# Patient Record
Sex: FEMALE | Race: WHITE | NOT HISPANIC OR LATINO | URBAN - METROPOLITAN AREA
[De-identification: names, ages, dates, MRNs, and addresses within clinical notes are randomized per-mention and may not be internally consistent; named-entity substitution may affect disease eponyms.]

---

## 2019-09-26 ENCOUNTER — INPATIENT (INPATIENT)
Facility: HOSPITAL | Age: 84
LOS: 3 days | Discharge: HOME | DRG: 299 | End: 2019-09-30
Attending: INTERNAL MEDICINE | Admitting: INTERNAL MEDICINE
Payer: MEDICARE

## 2019-09-26 VITALS
SYSTOLIC BLOOD PRESSURE: 137 MMHG | RESPIRATION RATE: 30 BRPM | DIASTOLIC BLOOD PRESSURE: 87 MMHG | HEART RATE: 96 BPM | OXYGEN SATURATION: 99 %

## 2019-09-26 DIAGNOSIS — R09.89 OTHER SPECIFIED SYMPTOMS AND SIGNS INVOLVING THE CIRCULATORY AND RESPIRATORY SYSTEMS: ICD-10-CM

## 2019-09-26 DIAGNOSIS — I26.09 OTHER PULMONARY EMBOLISM WITH ACUTE COR PULMONALE: ICD-10-CM

## 2019-09-26 DIAGNOSIS — Z90.49 ACQUIRED ABSENCE OF OTHER SPECIFIED PARTS OF DIGESTIVE TRACT: Chronic | ICD-10-CM

## 2019-09-26 DIAGNOSIS — I26.99 OTHER PULMONARY EMBOLISM WITHOUT ACUTE COR PULMONALE: ICD-10-CM

## 2019-09-26 LAB
ALBUMIN SERPL ELPH-MCNC: 3.8 G/DL — SIGNIFICANT CHANGE UP (ref 3.3–5)
ALP SERPL-CCNC: 80 U/L — SIGNIFICANT CHANGE UP (ref 40–120)
ALT FLD-CCNC: 20 U/L — SIGNIFICANT CHANGE UP (ref 10–45)
ANION GAP SERPL CALC-SCNC: 12 MMOL/L — SIGNIFICANT CHANGE UP (ref 5–17)
APTT BLD: 43.4 SEC — HIGH (ref 27.5–36.3)
AST SERPL-CCNC: 18 U/L — SIGNIFICANT CHANGE UP (ref 10–40)
BASOPHILS # BLD AUTO: 0.04 K/UL — SIGNIFICANT CHANGE UP (ref 0–0.2)
BASOPHILS NFR BLD AUTO: 0.4 % — SIGNIFICANT CHANGE UP (ref 0–2)
BILIRUB SERPL-MCNC: 0.5 MG/DL — SIGNIFICANT CHANGE UP (ref 0.2–1.2)
BLD GP AB SCN SERPL QL: NEGATIVE — SIGNIFICANT CHANGE UP
BUN SERPL-MCNC: 28 MG/DL — HIGH (ref 7–23)
CALCIUM SERPL-MCNC: 9.3 MG/DL — SIGNIFICANT CHANGE UP (ref 8.4–10.5)
CHLORIDE SERPL-SCNC: 104 MMOL/L — SIGNIFICANT CHANGE UP (ref 96–108)
CO2 SERPL-SCNC: 25 MMOL/L — SIGNIFICANT CHANGE UP (ref 22–31)
CREAT SERPL-MCNC: 1.09 MG/DL — SIGNIFICANT CHANGE UP (ref 0.5–1.3)
EOSINOPHIL # BLD AUTO: 0.08 K/UL — SIGNIFICANT CHANGE UP (ref 0–0.5)
EOSINOPHIL NFR BLD AUTO: 0.8 % — SIGNIFICANT CHANGE UP (ref 0–6)
GLUCOSE BLDC GLUCOMTR-MCNC: 110 MG/DL — HIGH (ref 70–99)
GLUCOSE SERPL-MCNC: 111 MG/DL — HIGH (ref 70–99)
HCT VFR BLD CALC: 35.9 % — SIGNIFICANT CHANGE UP (ref 34.5–45)
HGB BLD-MCNC: 11.6 G/DL — SIGNIFICANT CHANGE UP (ref 11.5–15.5)
IMM GRANULOCYTES NFR BLD AUTO: 0.2 % — SIGNIFICANT CHANGE UP (ref 0–1.5)
INR BLD: 1.17 — HIGH (ref 0.88–1.16)
LYMPHOCYTES # BLD AUTO: 1.97 K/UL — SIGNIFICANT CHANGE UP (ref 1–3.3)
LYMPHOCYTES # BLD AUTO: 19.8 % — SIGNIFICANT CHANGE UP (ref 13–44)
MCHC RBC-ENTMCNC: 29.4 PG — SIGNIFICANT CHANGE UP (ref 27–34)
MCHC RBC-ENTMCNC: 32.3 GM/DL — SIGNIFICANT CHANGE UP (ref 32–36)
MCV RBC AUTO: 90.9 FL — SIGNIFICANT CHANGE UP (ref 80–100)
MONOCYTES # BLD AUTO: 0.91 K/UL — HIGH (ref 0–0.9)
MONOCYTES NFR BLD AUTO: 9.1 % — SIGNIFICANT CHANGE UP (ref 2–14)
NEUTROPHILS # BLD AUTO: 6.95 K/UL — SIGNIFICANT CHANGE UP (ref 1.8–7.4)
NEUTROPHILS NFR BLD AUTO: 69.7 % — SIGNIFICANT CHANGE UP (ref 43–77)
NRBC # BLD: 0 /100 WBCS — SIGNIFICANT CHANGE UP (ref 0–0)
NT-PROBNP SERPL-SCNC: 7161 PG/ML — HIGH (ref 0–300)
PLATELET # BLD AUTO: 281 K/UL — SIGNIFICANT CHANGE UP (ref 150–400)
POTASSIUM SERPL-MCNC: 4.6 MMOL/L — SIGNIFICANT CHANGE UP (ref 3.5–5.3)
POTASSIUM SERPL-SCNC: 4.6 MMOL/L — SIGNIFICANT CHANGE UP (ref 3.5–5.3)
PROT SERPL-MCNC: 6.9 G/DL — SIGNIFICANT CHANGE UP (ref 6–8.3)
PROTHROM AB SERPL-ACNC: 13.3 SEC — HIGH (ref 10–12.9)
RBC # BLD: 3.95 M/UL — SIGNIFICANT CHANGE UP (ref 3.8–5.2)
RBC # FLD: 13.6 % — SIGNIFICANT CHANGE UP (ref 10.3–14.5)
RH IG SCN BLD-IMP: POSITIVE — SIGNIFICANT CHANGE UP
SODIUM SERPL-SCNC: 141 MMOL/L — SIGNIFICANT CHANGE UP (ref 135–145)
TROPONIN T SERPL-MCNC: 0.04 NG/ML — CRITICAL HIGH (ref 0–0.01)
WBC # BLD: 9.97 K/UL — SIGNIFICANT CHANGE UP (ref 3.8–10.5)
WBC # FLD AUTO: 9.97 K/UL — SIGNIFICANT CHANGE UP (ref 3.8–10.5)

## 2019-09-26 PROCEDURE — 93970 EXTREMITY STUDY: CPT | Mod: 26

## 2019-09-26 PROCEDURE — 99223 1ST HOSP IP/OBS HIGH 75: CPT | Mod: GC

## 2019-09-26 PROCEDURE — 93010 ELECTROCARDIOGRAM REPORT: CPT

## 2019-09-26 RX ORDER — METFORMIN HYDROCHLORIDE 850 MG/1
1 TABLET ORAL
Qty: 0 | Refills: 0 | DISCHARGE

## 2019-09-26 RX ORDER — FOLIC ACID 0.8 MG
1 TABLET ORAL
Qty: 0 | Refills: 0 | DISCHARGE

## 2019-09-26 RX ORDER — OMEPRAZOLE 10 MG/1
1 CAPSULE, DELAYED RELEASE ORAL
Qty: 0 | Refills: 0 | DISCHARGE

## 2019-09-26 RX ORDER — DEXTROSE 50 % IN WATER 50 %
25 SYRINGE (ML) INTRAVENOUS ONCE
Refills: 0 | Status: DISCONTINUED | OUTPATIENT
Start: 2019-09-26 | End: 2019-09-30

## 2019-09-26 RX ORDER — HEPARIN SODIUM 5000 [USP'U]/ML
5500 INJECTION INTRAVENOUS; SUBCUTANEOUS ONCE
Refills: 0 | Status: DISCONTINUED | OUTPATIENT
Start: 2019-09-26 | End: 2019-09-26

## 2019-09-26 RX ORDER — DEXTROSE 50 % IN WATER 50 %
12.5 SYRINGE (ML) INTRAVENOUS ONCE
Refills: 0 | Status: DISCONTINUED | OUTPATIENT
Start: 2019-09-26 | End: 2019-09-30

## 2019-09-26 RX ORDER — INSULIN LISPRO 100/ML
VIAL (ML) SUBCUTANEOUS EVERY 6 HOURS
Refills: 0 | Status: DISCONTINUED | OUTPATIENT
Start: 2019-09-26 | End: 2019-09-30

## 2019-09-26 RX ORDER — HEPARIN SODIUM 5000 [USP'U]/ML
1200 INJECTION INTRAVENOUS; SUBCUTANEOUS
Qty: 25000 | Refills: 0 | Status: DISCONTINUED | OUTPATIENT
Start: 2019-09-26 | End: 2019-09-26

## 2019-09-26 RX ORDER — CHLORHEXIDINE GLUCONATE 213 G/1000ML
1 SOLUTION TOPICAL
Refills: 0 | Status: DISCONTINUED | OUTPATIENT
Start: 2019-09-26 | End: 2019-09-26

## 2019-09-26 RX ORDER — GLUCAGON INJECTION, SOLUTION 0.5 MG/.1ML
1 INJECTION, SOLUTION SUBCUTANEOUS ONCE
Refills: 0 | Status: DISCONTINUED | OUTPATIENT
Start: 2019-09-26 | End: 2019-09-30

## 2019-09-26 RX ORDER — DEXTROSE 50 % IN WATER 50 %
15 SYRINGE (ML) INTRAVENOUS ONCE
Refills: 0 | Status: DISCONTINUED | OUTPATIENT
Start: 2019-09-26 | End: 2019-09-30

## 2019-09-26 RX ORDER — INFLUENZA VIRUS VACCINE 15; 15; 15; 15 UG/.5ML; UG/.5ML; UG/.5ML; UG/.5ML
0.5 SUSPENSION INTRAMUSCULAR ONCE
Refills: 0 | Status: COMPLETED | OUTPATIENT
Start: 2019-09-26 | End: 2019-09-30

## 2019-09-26 RX ORDER — LOSARTAN POTASSIUM 100 MG/1
1 TABLET, FILM COATED ORAL
Qty: 0 | Refills: 0 | DISCHARGE

## 2019-09-26 RX ORDER — CHLORHEXIDINE GLUCONATE 213 G/1000ML
1 SOLUTION TOPICAL DAILY
Refills: 0 | Status: DISCONTINUED | OUTPATIENT
Start: 2019-09-26 | End: 2019-09-30

## 2019-09-26 RX ORDER — HEPARIN SODIUM 5000 [USP'U]/ML
1200 INJECTION INTRAVENOUS; SUBCUTANEOUS
Qty: 25000 | Refills: 0 | Status: DISCONTINUED | OUTPATIENT
Start: 2019-09-26 | End: 2019-09-27

## 2019-09-26 RX ORDER — CARVEDILOL PHOSPHATE 80 MG/1
1 CAPSULE, EXTENDED RELEASE ORAL
Qty: 0 | Refills: 0 | DISCHARGE

## 2019-09-26 RX ORDER — HEPARIN SODIUM 5000 [USP'U]/ML
5500 INJECTION INTRAVENOUS; SUBCUTANEOUS ONCE
Refills: 0 | Status: COMPLETED | OUTPATIENT
Start: 2019-09-26 | End: 2019-09-26

## 2019-09-26 RX ORDER — SODIUM CHLORIDE 9 MG/ML
1000 INJECTION, SOLUTION INTRAVENOUS
Refills: 0 | Status: DISCONTINUED | OUTPATIENT
Start: 2019-09-26 | End: 2019-09-30

## 2019-09-26 RX ADMIN — HEPARIN SODIUM 12 UNIT(S)/HR: 5000 INJECTION INTRAVENOUS; SUBCUTANEOUS at 21:03

## 2019-09-26 RX ADMIN — HEPARIN SODIUM 5500 UNIT(S): 5000 INJECTION INTRAVENOUS; SUBCUTANEOUS at 21:02

## 2019-09-26 NOTE — H&P ADULT - ASSESSMENT
86 y/o F with PMH of HTN, Prediabetes, s/p cholecystectomy 8/23/19 presenting from [facility] with bilateral PE extending from right and left main pulmonary arteries into segmental and subsegmental branches admitted for possible catheter directed intervention for PE.    NEURO  -Patient AAO      CARDIAC    #HTN        PULMONARY    #Bilateral PE  -Patient has right heart strain on imaging 88 y/o F with PMH of HTN, Prediabetes, s/p cholecystectomy 8/23/19 presenting from [facility] with bilateral PE extending from right and left main pulmonary arteries into segmental and subsegmental branches admitted for possible catheter directed intervention for PE.    NEURO  -Patient AAO      CARDIAC    #Troponemia  -troponin of 0.04 on admission, likely 2/2 to PE  -continue to trend q6h    #HTN  -hold home BP meds as patient is normotensive      PULMONARY    #Bilateral PE  -Patient has right heart strain on bedside echo  -Was given Lovenox at 1:30pm  -Will start heparin bolus and drip at 9pm  -F/u LE Dopplers  -6 minute ambulation test, if fails will need catheter directed tPA    ENDO    #Prediabetes  -Patient on metformin at home  -mISS  -f/u A1c    GI  -No active issues at this time    RENAL  -Patient BUN is 28 on admission  -Continue to trend    F: None indicated  E: Replete when K<4 and Mg<2  N: DASH Consistent Carb  D: Heparin bolus and drip  Disposition: MICU

## 2019-09-26 NOTE — CONSULT NOTE ADULT - PROBLEM SELECTOR RECOMMENDATION 2
As discussed above in problem #1 and in imaging findings above, concern for RPV DVT on bedside POCUS this evening 9/26    Recommendations:  - would strongly suggest correlation with official LE doppler study As discussed above in problem #1 and in imaging findings above, concern for RPV DVT on bedside POCUS this evening 9/26    Recommendations: - would strongly suggest correlation with official LE doppler study.       F2 Addendum: Patient seen and examined at bedside AM of 9/27/19. She feels overall improved at rest with not much breathing issues. I have ambulated her off of oxygen today - she desaturated to 87%, became tachycardic to 110's, and tachypneic to 25-30 after 50 feet or so. She was taken back to the bed afterwards, where her respiratory symptoms improved after rest. Given severity of symptoms and restriction of her physical exertion capabilities in an otherwise very functional elderly lady, we have contacted interventional cardiology as she is likely a candidate for catheter directed tPA.

## 2019-09-26 NOTE — CONSULT NOTE ADULT - SUBJECTIVE AND OBJECTIVE BOX
PULMONARY EMBOLISM RESPONSE TEAM (PERT) INITIAL EVALUATION    HPI:  86 y/o F with PMH of HTN, Prediabetes, s/p cholecystectomy 8/23/19 presenting from [facility] with bilateral PE extending from right and left main pulmonary arteries into segmental and subsegmental branches admitted for possible catheter directed intervention for PE. Patient states that she has had shortness of breath for the last week that got worse last night. She has not been able to walk to the bathroom without getting short of breath the past 2 days. She states that she was much more active prior to her surgery, but admits that she has been less active as she is recovering from her surgery. She presented to [facility] with tachypnea and tachycardia, but her lungs were clear and she had no leg swelling. Her troponins were found to be elevated. On imaging she was found to have bilateral PE. She also had reflux of contrast into the IVC significant for right heart strain. She received lovenox at 1:30pm. She currently denies chest pain, palpitations, shortness of breath, nausea, vomiting, diarrhea, constipation, fevers, chills.    In the ED, vitals as follows: T: 97.1 F | HR: 100 | BP: 132/80 | RR: 26 | SpO2: 97% on RA  Labs significant for: troponin 0.04, BNP 7161  Imaging: from [facility]  EKG: Sinus tachycardia, no ischemic changes (26 Sep 2019 19:39)    ADDITIONAL PULMONARY HPI:  Besides what is stated above, she denies any prior personal or FH of VTE or clotting disorders.     REVIEW OF SYSTEMS:  Constitutional: No fever, weight loss or fatigue  Eyes: No eye pain, visual disturbances, or discharge  ENMT:  No difficulty hearing, tinnitus, vertigo; No sinus or throat pain  Neck: No pain, stiffness or neck swelling  Respiratory: see HPI  Cardiovascular: No chest pain, palpitations, dizziness or leg swelling  Gastrointestinal: No abdominal or epigastric pain. No nausea, vomiting or hematemesis; No diarrhea or constipation. No melena or hematochezia.  Genitourinary: No dysuria, frequency, hematuria or incontinence  Neurological: No headaches, memory loss, loss of strength, numbness or tremors  Skin: No itching, burning, rashes or lesions   Lymph Nodes: No enlarged glands  Endocrine: No heat or cold intolerance; No hair loss  Musculoskeletal: No joint pain or swelling; No muscle, back or extremity pain  Psychiatric: No depression, anxiety, mood swings or difficulty sleeping  Heme/Lymph: No easy bruising or bleeding gums  Allergy and Immunologic: No hives or eczema    PAST MEDICAL & SURGICAL HISTORY:  Pre-diabetes  HTN (hypertension)  S/P cholecystectomy: 8/23/19    FAMILY HISTORY:  No FH of VTE or clotting disorders    SOCIAL HISTORY:  Smoking Status: [ ] Current, [ ] Former, [X] Never  Pack Years:    MEDICATIONS:  Pulmonary:    Antimicrobials:    Anticoagulants:  heparin  Infusion 1200 Unit(s)/Hr IV Continuous <Continuous>    Onc:    GI/:    Endocrine:  dextrose 40% Gel 15 Gram(s) Oral once PRN  dextrose 50% Injectable 12.5 Gram(s) IV Push once  dextrose 50% Injectable 25 Gram(s) IV Push once  dextrose 50% Injectable 25 Gram(s) IV Push once  glucagon  Injectable 1 milliGRAM(s) IntraMuscular once PRN  insulin lispro (HumaLOG) corrective regimen sliding scale   SubCutaneous every 6 hours    Cardiac:    Other Medications:  chlorhexidine 2% Cloths 1 Application(s) Topical daily  dextrose 5%. 1000 milliLiter(s) IV Continuous <Continuous>  influenza   Vaccine 0.5 milliLiter(s) IntraMuscular once    Allergies    Allergy Status Unknown    Intolerances    Vital Signs Last 24 Hrs  T(C): 36.9 (26 Sep 2019 21:00), Max: 36.9 (26 Sep 2019 21:00)  T(F): 98.5 (26 Sep 2019 21:00), Max: 98.5 (26 Sep 2019 21:00)  HR: 94 (26 Sep 2019 20:00) (94 - 100)  BP: 128/78 (26 Sep 2019 20:00) (128/78 - 137/87)  BP(mean): 99 (26 Sep 2019 20:00) (98 - 104)  RR: 27 (26 Sep 2019 20:00) (26 - 30)  SpO2: 98% (26 Sep 2019 20:00) (97% - 99%)    09-26 @ 07:01  -  09-26 @ 21:07  --------------------------------------------------------  IN: 0 mL / OUT: 100 mL / NET: -100 mL    PHYSICAL EXAM:  Constitutional: well appearing woman resting in bed; NAD  Head: NC/AT  EENT: PERRL, anicteric sclera; oropharynx clear, MMM  Neck: supple   Respiratory: CTA B/L; no W/R/R - breathing comfortably on nasal cannula  Cardiovascular: +S1/S2, RRR  Gastrointestinal: soft, NT/ND; +BSx4  Extremities: WWP; mild LE edema but no clubbing or cyanosis  Vascular: 2+ radial pulses B/L  Neurological: awake and alert; PRYOR, following comamnds     LABS:  CBC Full  -  ( 26 Sep 2019 18:42 )  WBC Count : 9.97 K/uL  RBC Count : 3.95 M/uL  Hemoglobin : 11.6 g/dL  Hematocrit : 35.9 %  Platelet Count - Automated : 281 K/uL  Mean Cell Volume : 90.9 fl  Mean Cell Hemoglobin : 29.4 pg  Mean Cell Hemoglobin Concentration : 32.3 gm/dL  Auto Neutrophil # : 6.95 K/uL  Auto Lymphocyte # : 1.97 K/uL  Auto Monocyte # : 0.91 K/uL  Auto Eosinophil # : 0.08 K/uL  Auto Basophil # : 0.04 K/uL  Auto Neutrophil % : 69.7 %  Auto Lymphocyte % : 19.8 %  Auto Monocyte % : 9.1 %  Auto Eosinophil % : 0.8 %  Auto Basophil % : 0.4 %    09-26    141  |  104  |  28<H>  ----------------------------<  111<H>  4.6   |  25  |  1.09    Ca    9.3      26 Sep 2019 18:42    TPro  6.9  /  Alb  3.8  /  TBili  0.5  /  DBili  x   /  AST  18  /  ALT  20  /  AlkPhos  80  09-26    PT/INR - ( 26 Sep 2019 18:42 )   PT: 13.3 sec;   INR: 1.17          PTT - ( 26 Sep 2019 18:42 )  PTT:43.4 sec    RADIOLOGY & ADDITIONAL STUDIES:  Outside hospital CTA PE protocol personally reviewed - extensive bilateral thrombi noted in left and right main pulmonary arteries with extension of thrombi distally into segmental and subsegmental branches; markedly enlarged right ventricular size with RV>LV on CT consistent with radiographic right heart strain    BEDSIDE POCUS (performed by myself 9/26/19 @ ~1930hrs)  Lungs:  --> Right lung: B-line predominant pattern at the apex with A-line pred. at bases, no appreciable effusion  --> Left lung: A-line predominant pattern throughout; no appreciable effusion    Cardiac:  LV appears concentrically hypertrophic w/ normal to mildly reduced systolic function on gross appearance; RV >> LV diameter; no evidence of pericardial effusion    LE DVT study:  Unable to compress RPV and intraluminal echogenic material was observed concerning for thrombus -- strongly recommend official LE dopplers to correlate findings; remaining right and left LE venous study appeared normal

## 2019-09-26 NOTE — H&P ADULT - NSHPLABSRESULTS_GEN_ALL_CORE
LABS:                         11.6   9.97  )-----------( 281      ( 26 Sep 2019 18:42 )             35.9     09-26    141  |  104  |  28<H>  ----------------------------<  111<H>  4.6   |  25  |  1.09    Ca    9.3      26 Sep 2019 18:42    TPro  6.9  /  Alb  3.8  /  TBili  0.5  /  DBili  x   /  AST  18  /  ALT  20  /  AlkPhos  80  09-26    PT/INR - ( 26 Sep 2019 18:42 )   PT: 13.3 sec;   INR: 1.17          PTT - ( 26 Sep 2019 18:42 )  PTT:43.4 sec    CARDIAC MARKERS ( 26 Sep 2019 18:42 )  x     / 0.04 ng/mL / x     / x     / x          Serum Pro-Brain Natriuretic Peptide: 7161 pg/mL (09-26 @ 18:42)        RADIOLOGY, EKG & ADDITIONAL TESTS: Reviewed

## 2019-09-26 NOTE — H&P ADULT - HISTORY OF PRESENT ILLNESS
86 y/o F with PMH of HTN, Prediabetes, s/p cholecystectomy 8/23/19 presenting from [facility] with bilateral PE causing right heart strain. Patient states that she has had shortness of breath for the last week that got worse last night. She has not been able to walk to the bathroom without getting short of breath the past 2 days. She admits that she has been less active since her surgery as she has been recovering. She presented to [facility] with tachypnea and tachycardia, but her lungs were clear and she had no leg swelling. Her troponins were found to be elevated. On imaging she was found to have bilateral PE. She also had reflux of contrast into the IVC significant for right heart strain. She received lovenox at 1:30pm 86 y/o F with PMH of HTN, Prediabetes, s/p cholecystectomy 8/23/19 presenting from [facility] with bilateral PE extending from right and left main pulmonary arteries into segmental and subsegmental branches admitted for possible catheter directed intervention for PE. Patient states that she has had shortness of breath for the last week that got worse last night. She has not been able to walk to the bathroom without getting short of breath the past 2 days. She states that she was much more active prior to her surgery, but admits that she has been less active as she is recovering from her surgery. She presented to [facility] with tachypnea and tachycardia, but her lungs were clear and she had no leg swelling. Her troponins were found to be elevated. On imaging she was found to have bilateral PE. She also had reflux of contrast into the IVC significant for right heart strain. She received lovenox at 1:30pm. She currently denies chest pain, palpitations, shortness of breath, nausea, vomiting, diarrhea, constipation, fevers, chills.    In the ED, vitals as follows: T: 97.1 F | HR: 100 | BP: 132/80 | RR: 26 | SpO2: 97% on RA  Labs significant for: troponin 0.04, BNP 7161  Imaging: from [facility]  EKG: 86 y/o F with PMH of HTN, Prediabetes, s/p cholecystectomy 8/23/19 presenting from [facility] with bilateral PE extending from right and left main pulmonary arteries into segmental and subsegmental branches admitted for possible catheter directed intervention for PE. Patient states that she has had shortness of breath for the last week that got worse last night. She has not been able to walk to the bathroom without getting short of breath the past 2 days. She states that she was much more active prior to her surgery, but admits that she has been less active as she is recovering from her surgery. She presented to [facility] with tachypnea and tachycardia, but her lungs were clear and she had no leg swelling. Her troponins were found to be elevated. On imaging she was found to have bilateral PE. She also had reflux of contrast into the IVC significant for right heart strain. She received lovenox at 1:30pm. She currently denies chest pain, palpitations, shortness of breath, nausea, vomiting, diarrhea, constipation, fevers, chills.    In the ED, vitals as follows: T: 97.1 F | HR: 100 | BP: 132/80 | RR: 26 | SpO2: 97% on RA  Labs significant for: troponin 0.04, BNP 7161  Imaging: from [facility]  EKG: Sinus tachycardia, no ischemic changes yes

## 2019-09-26 NOTE — H&P ADULT - NSHPSOCIALHISTORY_GEN_ALL_CORE
Patient states she has never smoked. She does not drink alcohol. She denies using any other drugs. She lives alone, though she lives on the floor above her daughter, who checks in on her daily and takes her grocery shopping. Patient is very independent.

## 2019-09-26 NOTE — CONSULT NOTE ADULT - PROBLEM SELECTOR RECOMMENDATION 9
As discussed above in imaging/US findings and personal review of her outside CT scan, she has bilateral extensive thrombi in L+R main PAs extending distally to segmental and subsegmental pulmonary arteries with troponinemia and elevated pro-BNP but without HD instability therefore submassive PE; was given dose of lovenox at OSH. Her PE is likely provoked given her recent post-operative state.    Recommendations:  - multi-disciplinary PERT evaluation underway; to be eval by interventional cardiology for consideration of catheter directed tPA pending clinical course overnight/sx in AM; please keep NPOpMN in case she requires intervention in AM  - would start hep gtt w/ bolus approximately 8hrs following her last dose of SQL with PTT trended for full anticoagulant dosing  - please obtain stat echocardiography (performed at time of note writing)  - please obtain serum troponin and pro-BNP (resulted at time of note writing)  - please obtain LE doppler study (as discussed in imaging above, concern for RPV DVT on bedside POCUS - verbally communicated to ICU team)  - cont supplemental O2 PRN to maintain SpO2=>92%  - should be ambulated in AM w/ SpO2 and HR monitoring to assess for HD changes/hypoxemia  - incentive spirometry 10x/hr while in bed    The above was discussed w/ patient, her daughter, ICU team, and attending

## 2019-09-26 NOTE — H&P ADULT - NSHPPHYSICALEXAM_GEN_ALL_CORE
VITAL SIGNS:  T(F): 97.1 (09-26-19 @ 18:12), Max: 97.1 (09-26-19 @ 18:12)  HR: 96 (09-26-19 @ 18:00) (96 - 96)  BP: 137/87 (09-26-19 @ 18:00) (137/87 - 137/87)  BP(mean): 104 (09-26-19 @ 18:00) (104 - 104)  RR: 30 (09-26-19 @ 18:00) (30 - 30)  SpO2: 99% (09-26-19 @ 18:00) (99% - 99%)    PHYSICAL EXAM:    Constitutional: WDWN resting comfortably in bed; NAD  HEENT: NC/AT, PERRL, EOMI, anicteric sclera; MMM  Neck: supple  Respiratory: CTA B/L; no W/R/R, no retractions  Cardiac: +S1/S2; Tachycardic; no M/R/G  Gastrointestinal: soft, NT/ND; no rebound or guarding; +BSx4  Extremities: WWP, no clubbing or cyanosis; no peripheral edema  Vascular: 2+ radial, DP/PT pulses B/L  Neurologic: AAO; CNII-XII grossly intact; no focal deficits

## 2019-09-27 LAB
ALBUMIN SERPL ELPH-MCNC: 3.5 G/DL — SIGNIFICANT CHANGE UP (ref 3.3–5)
ALP SERPL-CCNC: 71 U/L — SIGNIFICANT CHANGE UP (ref 40–120)
ALT FLD-CCNC: 17 U/L — SIGNIFICANT CHANGE UP (ref 10–45)
ANION GAP SERPL CALC-SCNC: 12 MMOL/L — SIGNIFICANT CHANGE UP (ref 5–17)
ANION GAP SERPL CALC-SCNC: 12 MMOL/L — SIGNIFICANT CHANGE UP (ref 5–17)
APTT BLD: 100.3 SEC — HIGH (ref 27.5–36.3)
APTT BLD: 136.8 SEC — CRITICAL HIGH (ref 27.5–36.3)
APTT BLD: 95.4 SEC — HIGH (ref 27.5–36.3)
APTT BLD: >200 SEC — CRITICAL HIGH (ref 27.5–36.3)
AST SERPL-CCNC: 15 U/L — SIGNIFICANT CHANGE UP (ref 10–40)
BASOPHILS # BLD AUTO: 0.06 K/UL — SIGNIFICANT CHANGE UP (ref 0–0.2)
BASOPHILS NFR BLD AUTO: 0.6 % — SIGNIFICANT CHANGE UP (ref 0–2)
BILIRUB SERPL-MCNC: 0.4 MG/DL — SIGNIFICANT CHANGE UP (ref 0.2–1.2)
BUN SERPL-MCNC: 24 MG/DL — HIGH (ref 7–23)
BUN SERPL-MCNC: 28 MG/DL — HIGH (ref 7–23)
CALCIUM SERPL-MCNC: 9 MG/DL — SIGNIFICANT CHANGE UP (ref 8.4–10.5)
CALCIUM SERPL-MCNC: 9.4 MG/DL — SIGNIFICANT CHANGE UP (ref 8.4–10.5)
CHLORIDE SERPL-SCNC: 103 MMOL/L — SIGNIFICANT CHANGE UP (ref 96–108)
CHLORIDE SERPL-SCNC: 106 MMOL/L — SIGNIFICANT CHANGE UP (ref 96–108)
CO2 SERPL-SCNC: 23 MMOL/L — SIGNIFICANT CHANGE UP (ref 22–31)
CO2 SERPL-SCNC: 24 MMOL/L — SIGNIFICANT CHANGE UP (ref 22–31)
CREAT SERPL-MCNC: 0.96 MG/DL — SIGNIFICANT CHANGE UP (ref 0.5–1.3)
CREAT SERPL-MCNC: 0.97 MG/DL — SIGNIFICANT CHANGE UP (ref 0.5–1.3)
EOSINOPHIL # BLD AUTO: 0.16 K/UL — SIGNIFICANT CHANGE UP (ref 0–0.5)
EOSINOPHIL NFR BLD AUTO: 1.6 % — SIGNIFICANT CHANGE UP (ref 0–6)
GLUCOSE BLDC GLUCOMTR-MCNC: 129 MG/DL — HIGH (ref 70–99)
GLUCOSE BLDC GLUCOMTR-MCNC: 133 MG/DL — HIGH (ref 70–99)
GLUCOSE BLDC GLUCOMTR-MCNC: 136 MG/DL — HIGH (ref 70–99)
GLUCOSE BLDC GLUCOMTR-MCNC: 167 MG/DL — HIGH (ref 70–99)
GLUCOSE BLDC GLUCOMTR-MCNC: 170 MG/DL — HIGH (ref 70–99)
GLUCOSE SERPL-MCNC: 122 MG/DL — HIGH (ref 70–99)
GLUCOSE SERPL-MCNC: 144 MG/DL — HIGH (ref 70–99)
HBA1C BLD-MCNC: 6.9 % — HIGH (ref 4–5.6)
HCT VFR BLD CALC: 32.5 % — LOW (ref 34.5–45)
HGB BLD-MCNC: 10.6 G/DL — LOW (ref 11.5–15.5)
IMM GRANULOCYTES NFR BLD AUTO: 0.4 % — SIGNIFICANT CHANGE UP (ref 0–1.5)
INR BLD: 1.27 — HIGH (ref 0.88–1.16)
LYMPHOCYTES # BLD AUTO: 1.96 K/UL — SIGNIFICANT CHANGE UP (ref 1–3.3)
LYMPHOCYTES # BLD AUTO: 20.2 % — SIGNIFICANT CHANGE UP (ref 13–44)
MAGNESIUM SERPL-MCNC: 0.9 MG/DL — CRITICAL LOW (ref 1.6–2.6)
MAGNESIUM SERPL-MCNC: 3 MG/DL — HIGH (ref 1.6–2.6)
MCHC RBC-ENTMCNC: 29.5 PG — SIGNIFICANT CHANGE UP (ref 27–34)
MCHC RBC-ENTMCNC: 32.6 GM/DL — SIGNIFICANT CHANGE UP (ref 32–36)
MCV RBC AUTO: 90.5 FL — SIGNIFICANT CHANGE UP (ref 80–100)
MONOCYTES # BLD AUTO: 1.02 K/UL — HIGH (ref 0–0.9)
MONOCYTES NFR BLD AUTO: 10.5 % — SIGNIFICANT CHANGE UP (ref 2–14)
NEUTROPHILS # BLD AUTO: 6.48 K/UL — SIGNIFICANT CHANGE UP (ref 1.8–7.4)
NEUTROPHILS NFR BLD AUTO: 66.7 % — SIGNIFICANT CHANGE UP (ref 43–77)
NRBC # BLD: 0 /100 WBCS — SIGNIFICANT CHANGE UP (ref 0–0)
PHOSPHATE SERPL-MCNC: 3.3 MG/DL — SIGNIFICANT CHANGE UP (ref 2.5–4.5)
PLATELET # BLD AUTO: 268 K/UL — SIGNIFICANT CHANGE UP (ref 150–400)
POTASSIUM SERPL-MCNC: 4.2 MMOL/L — SIGNIFICANT CHANGE UP (ref 3.5–5.3)
POTASSIUM SERPL-MCNC: 4.3 MMOL/L — SIGNIFICANT CHANGE UP (ref 3.5–5.3)
POTASSIUM SERPL-SCNC: 4.2 MMOL/L — SIGNIFICANT CHANGE UP (ref 3.5–5.3)
POTASSIUM SERPL-SCNC: 4.3 MMOL/L — SIGNIFICANT CHANGE UP (ref 3.5–5.3)
PROT SERPL-MCNC: 6.2 G/DL — SIGNIFICANT CHANGE UP (ref 6–8.3)
PROTHROM AB SERPL-ACNC: 14.5 SEC — HIGH (ref 10–12.9)
RBC # BLD: 3.59 M/UL — LOW (ref 3.8–5.2)
RBC # FLD: 13.7 % — SIGNIFICANT CHANGE UP (ref 10.3–14.5)
SODIUM SERPL-SCNC: 139 MMOL/L — SIGNIFICANT CHANGE UP (ref 135–145)
SODIUM SERPL-SCNC: 141 MMOL/L — SIGNIFICANT CHANGE UP (ref 135–145)
TROPONIN T SERPL-MCNC: 0.02 NG/ML — HIGH (ref 0–0.01)
WBC # BLD: 9.72 K/UL — SIGNIFICANT CHANGE UP (ref 3.8–10.5)
WBC # FLD AUTO: 9.72 K/UL — SIGNIFICANT CHANGE UP (ref 3.8–10.5)

## 2019-09-27 PROCEDURE — 99233 SBSQ HOSP IP/OBS HIGH 50: CPT | Mod: GC

## 2019-09-27 PROCEDURE — 93306 TTE W/DOPPLER COMPLETE: CPT | Mod: 26

## 2019-09-27 PROCEDURE — 71045 X-RAY EXAM CHEST 1 VIEW: CPT | Mod: 26

## 2019-09-27 RX ORDER — MAGNESIUM SULFATE 500 MG/ML
4 VIAL (ML) INJECTION
Refills: 0 | Status: COMPLETED | OUTPATIENT
Start: 2019-09-27 | End: 2019-09-27

## 2019-09-27 RX ORDER — HEPARIN SODIUM 5000 [USP'U]/ML
1000 INJECTION INTRAVENOUS; SUBCUTANEOUS
Qty: 25000 | Refills: 0 | Status: DISCONTINUED | OUTPATIENT
Start: 2019-09-27 | End: 2019-09-27

## 2019-09-27 RX ORDER — HEPARIN SODIUM 5000 [USP'U]/ML
800 INJECTION INTRAVENOUS; SUBCUTANEOUS
Qty: 25000 | Refills: 0 | Status: DISCONTINUED | OUTPATIENT
Start: 2019-09-27 | End: 2019-09-28

## 2019-09-27 RX ADMIN — HEPARIN SODIUM 8 UNIT(S)/HR: 5000 INJECTION INTRAVENOUS; SUBCUTANEOUS at 20:39

## 2019-09-27 RX ADMIN — HEPARIN SODIUM 10 UNIT(S)/HR: 5000 INJECTION INTRAVENOUS; SUBCUTANEOUS at 05:30

## 2019-09-27 RX ADMIN — Medication 50 GRAM(S): at 04:30

## 2019-09-27 RX ADMIN — Medication 50 GRAM(S): at 05:56

## 2019-09-27 RX ADMIN — CHLORHEXIDINE GLUCONATE 1 APPLICATION(S): 213 SOLUTION TOPICAL at 07:00

## 2019-09-27 RX ADMIN — Medication 2: at 19:20

## 2019-09-27 NOTE — PROGRESS NOTE ADULT - SUBJECTIVE AND OBJECTIVE BOX
OVERNIGHT EVENTS:    INTERVAL HPI:    VITAL SIGNS:  ICU Vital Signs Last 24 Hrs  T(C): 36.5 (27 Sep 2019 05:42), Max: 36.9 (26 Sep 2019 21:00)  T(F): 97.7 (27 Sep 2019 05:42), Max: 98.5 (26 Sep 2019 21:00)  HR: 88 (27 Sep 2019 06:00) (88 - 102)  BP: 109/67 (27 Sep 2019 06:00) (100/65 - 137/87)  BP(mean): 80 (27 Sep 2019 06:00) (76 - 104)  RR: 28 (27 Sep 2019 06:00) (20 - 30)  SpO2: 96% (27 Sep 2019 06:00) (94% - 99%)      PHYSICAL EXAM:    General: in NAD, lying comfortably in bed  HEENT: normocephalic, atraumatic; PERRL, anicteric sclera; MMM  Neck: supple, no JVD, no thyromegaly, no lymphadenopathy  Cardiovascular: +S1/S2, RRR, no M/G/R  Respiratory: clear to auscultation B/L; no wheezing, no rales, no rhonchi  Gastrointestinal: soft, NT/ND; +BSx4, no organomegaly  Extremities: WWP; no edema, clubbing or cyanosis  Vascular: 2+ radial, DP/PT pulses B/L  Neurological: AAOx3; no focal deficits    MEDICATIONS:  MEDICATIONS  (STANDING):  chlorhexidine 2% Cloths 1 Application(s) Topical daily  dextrose 5%. 1000 milliLiter(s) (50 mL/Hr) IV Continuous <Continuous>  dextrose 50% Injectable 12.5 Gram(s) IV Push once  dextrose 50% Injectable 25 Gram(s) IV Push once  dextrose 50% Injectable 25 Gram(s) IV Push once  heparin  Infusion 1000 Unit(s)/Hr (10 mL/Hr) IV Continuous <Continuous>  influenza   Vaccine 0.5 milliLiter(s) IntraMuscular once  insulin lispro (HumaLOG) corrective regimen sliding scale   SubCutaneous every 6 hours    MEDICATIONS  (PRN):  dextrose 40% Gel 15 Gram(s) Oral once PRN Blood Glucose LESS THAN 70 milliGRAM(s)/deciliter  glucagon  Injectable 1 milliGRAM(s) IntraMuscular once PRN Glucose LESS THAN 70 milligrams/deciliter      ALLERGIES:  Allergies    Allergy Status Unknown    Intolerances        LABS:                        10.6   9.72  )-----------( 268      ( 27 Sep 2019 03:38 )             32.5     09-27    141  |  106  |  28<H>  ----------------------------<  122<H>  4.2   |  23  |  0.96    Ca    9.0      27 Sep 2019 03:38  Mg     .9     09-27    TPro  6.2  /  Alb  3.5  /  TBili  0.4  /  DBili  x   /  AST  15  /  ALT  17  /  AlkPhos  71  09-27    PT/INR - ( 27 Sep 2019 03:38 )   PT: 14.5 sec;   INR: 1.27          PTT - ( 27 Sep 2019 03:38 )  PTT:136.8 sec    CAPILLARY BLOOD GLUCOSE      POCT Blood Glucose.: 136 mg/dL (27 Sep 2019 05:58)      RADIOLOGY & ADDITIONAL TESTS: Reviewed. OVERNIGHT EVENTS:    INTERVAL HPI:    VITAL SIGNS:  ICU Vital Signs Last 24 Hrs  T(C): 36.5 (27 Sep 2019 05:42), Max: 36.9 (26 Sep 2019 21:00)  T(F): 97.7 (27 Sep 2019 05:42), Max: 98.5 (26 Sep 2019 21:00)  HR: 88 (27 Sep 2019 06:00) (88 - 102)  BP: 109/67 (27 Sep 2019 06:00) (100/65 - 137/87)  BP(mean): 80 (27 Sep 2019 06:00) (76 - 104)  RR: 28 (27 Sep 2019 06:00) (20 - 30)  SpO2: 96% (27 Sep 2019 06:00) (94% - 99%)      I&O's Summary    26 Sep 2019 07:01  -  27 Sep 2019 07:00  --------------------------------------------------------  IN: 490 mL / OUT: 500 mL / NET: -10 mL    27 Sep 2019 07:01  -  27 Sep 2019 17:28  --------------------------------------------------------  IN: 516 mL / OUT: 300 mL / NET: 216 mL        PHYSICAL EXAM:    General: in NAD, lying comfortably in bed  HEENT: NC/AT; PERRL, anicteric sclera; MMM  Neck: supple  Cardiovascular: +S1/S2, RRR, no M/R/G  Respiratory: clear to auscultation B/L; W/R/R  Gastrointestinal: soft, NT/ND; +BSx4  Extremities: WWP; no edema, clubbing or cyanosis  Vascular: 2+ radial, DP/PT pulses B/L  Neurological: AAOx3; no focal deficits    MEDICATIONS:  MEDICATIONS  (STANDING):  chlorhexidine 2% Cloths 1 Application(s) Topical daily  dextrose 5%. 1000 milliLiter(s) (50 mL/Hr) IV Continuous <Continuous>  dextrose 50% Injectable 12.5 Gram(s) IV Push once  dextrose 50% Injectable 25 Gram(s) IV Push once  dextrose 50% Injectable 25 Gram(s) IV Push once  heparin  Infusion 1000 Unit(s)/Hr (10 mL/Hr) IV Continuous <Continuous>  influenza   Vaccine 0.5 milliLiter(s) IntraMuscular once  insulin lispro (HumaLOG) corrective regimen sliding scale   SubCutaneous every 6 hours    MEDICATIONS  (PRN):  dextrose 40% Gel 15 Gram(s) Oral once PRN Blood Glucose LESS THAN 70 milliGRAM(s)/deciliter  glucagon  Injectable 1 milliGRAM(s) IntraMuscular once PRN Glucose LESS THAN 70 milligrams/deciliter      ALLERGIES:  Allergies    Allergy Status Unknown    Intolerances        LABS:                        10.6   9.72  )-----------( 268      ( 27 Sep 2019 03:38 )             32.5     09-27    141  |  106  |  28<H>  ----------------------------<  122<H>  4.2   |  23  |  0.96    Ca    9.0      27 Sep 2019 03:38  Mg     .9     09-27    TPro  6.2  /  Alb  3.5  /  TBili  0.4  /  DBili  x   /  AST  15  /  ALT  17  /  AlkPhos  71  09-27    PT/INR - ( 27 Sep 2019 03:38 )   PT: 14.5 sec;   INR: 1.27          PTT - ( 27 Sep 2019 03:38 )  PTT:136.8 sec    CAPILLARY BLOOD GLUCOSE      POCT Blood Glucose.: 136 mg/dL (27 Sep 2019 05:58)      RADIOLOGY & ADDITIONAL TESTS: Reviewed. OVERNIGHT EVENTS: Patient's PTT was elevated over 200 and Heparin drip was decreased.    INTERVAL HPI: Patient seen and examined this AM. She denies having any complaints. Discussed options for treatment (continue heparin vs tPA) and she will make a decision tomorrow.    VITAL SIGNS:  ICU Vital Signs Last 24 Hrs  T(C): 36.5 (27 Sep 2019 05:42), Max: 36.9 (26 Sep 2019 21:00)  T(F): 97.7 (27 Sep 2019 05:42), Max: 98.5 (26 Sep 2019 21:00)  HR: 88 (27 Sep 2019 06:00) (88 - 102)  BP: 109/67 (27 Sep 2019 06:00) (100/65 - 137/87)  BP(mean): 80 (27 Sep 2019 06:00) (76 - 104)  RR: 28 (27 Sep 2019 06:00) (20 - 30)  SpO2: 96% (27 Sep 2019 06:00) (94% - 99%)      I&O's Summary    26 Sep 2019 07:01  -  27 Sep 2019 07:00  --------------------------------------------------------  IN: 490 mL / OUT: 500 mL / NET: -10 mL    27 Sep 2019 07:01  -  27 Sep 2019 17:28  --------------------------------------------------------  IN: 516 mL / OUT: 300 mL / NET: 216 mL        PHYSICAL EXAM:    General: in NAD, lying comfortably in bed  HEENT: NC/AT; PERRL, anicteric sclera; MMM  Neck: supple  Cardiovascular: +S1/S2, RRR, no M/R/G  Respiratory: clear to auscultation B/L; W/R/R  Gastrointestinal: soft, NT/ND; +BSx4  Extremities: WWP; no edema, clubbing or cyanosis  Vascular: 2+ radial, DP/PT pulses B/L  Neurological: AAOx3; no focal deficits    MEDICATIONS:  MEDICATIONS  (STANDING):  chlorhexidine 2% Cloths 1 Application(s) Topical daily  dextrose 5%. 1000 milliLiter(s) (50 mL/Hr) IV Continuous <Continuous>  dextrose 50% Injectable 12.5 Gram(s) IV Push once  dextrose 50% Injectable 25 Gram(s) IV Push once  dextrose 50% Injectable 25 Gram(s) IV Push once  heparin  Infusion 1000 Unit(s)/Hr (10 mL/Hr) IV Continuous <Continuous>  influenza   Vaccine 0.5 milliLiter(s) IntraMuscular once  insulin lispro (HumaLOG) corrective regimen sliding scale   SubCutaneous every 6 hours    MEDICATIONS  (PRN):  dextrose 40% Gel 15 Gram(s) Oral once PRN Blood Glucose LESS THAN 70 milliGRAM(s)/deciliter  glucagon  Injectable 1 milliGRAM(s) IntraMuscular once PRN Glucose LESS THAN 70 milligrams/deciliter      ALLERGIES:  Allergies    Allergy Status Unknown    Intolerances        LABS:                        10.6   9.72  )-----------( 268      ( 27 Sep 2019 03:38 )             32.5     09-27    141  |  106  |  28<H>  ----------------------------<  122<H>  4.2   |  23  |  0.96    Ca    9.0      27 Sep 2019 03:38  Mg     .9     09-27    TPro  6.2  /  Alb  3.5  /  TBili  0.4  /  DBili  x   /  AST  15  /  ALT  17  /  AlkPhos  71  09-27    PT/INR - ( 27 Sep 2019 03:38 )   PT: 14.5 sec;   INR: 1.27          PTT - ( 27 Sep 2019 03:38 )  PTT:136.8 sec    CAPILLARY BLOOD GLUCOSE      POCT Blood Glucose.: 136 mg/dL (27 Sep 2019 05:58)      RADIOLOGY & ADDITIONAL TESTS: Reviewed. OVERNIGHT EVENTS: Patient's PTT was elevated over 200 and Heparin drip was decreased.    INTERVAL HPI: Patient seen and examined this AM. She denies having any complaints. Discussed options for treatment (continue heparin vs tPA) and she will make a decision tomorrow.    VITAL SIGNS:  ICU Vital Signs Last 24 Hrs  T(C): 36.5 (27 Sep 2019 05:42), Max: 36.9 (26 Sep 2019 21:00)  T(F): 97.7 (27 Sep 2019 05:42), Max: 98.5 (26 Sep 2019 21:00)  HR: 88 (27 Sep 2019 06:00) (88 - 102)  BP: 109/67 (27 Sep 2019 06:00) (100/65 - 137/87)  BP(mean): 80 (27 Sep 2019 06:00) (76 - 104)  RR: 28 (27 Sep 2019 06:00) (20 - 30)  SpO2: 96% (27 Sep 2019 06:00) (94% - 99%)      I&O's Summary    26 Sep 2019 07:01  -  27 Sep 2019 07:00  --------------------------------------------------------  IN: 490 mL / OUT: 500 mL / NET: -10 mL    27 Sep 2019 07:01  -  27 Sep 2019 17:28  --------------------------------------------------------  IN: 516 mL / OUT: 300 mL / NET: 216 mL        PHYSICAL EXAM:    General: in NAD, lying comfortably in bed  HEENT: NC/AT; PERRL, anicteric sclera; MMM  Neck: supple  Cardiovascular: +S1/S2, RRR, no M/R/G  Respiratory: clear to auscultation B/L; no W/R/R  Gastrointestinal: soft, NT/ND; +BSx4  Extremities: WWP; no edema, clubbing or cyanosis  Vascular: 2+ radial, DP/PT pulses B/L  Neurological: AAOx3; no focal deficits    MEDICATIONS:  MEDICATIONS  (STANDING):  chlorhexidine 2% Cloths 1 Application(s) Topical daily  dextrose 5%. 1000 milliLiter(s) (50 mL/Hr) IV Continuous <Continuous>  dextrose 50% Injectable 12.5 Gram(s) IV Push once  dextrose 50% Injectable 25 Gram(s) IV Push once  dextrose 50% Injectable 25 Gram(s) IV Push once  heparin  Infusion 1000 Unit(s)/Hr (10 mL/Hr) IV Continuous <Continuous>  influenza   Vaccine 0.5 milliLiter(s) IntraMuscular once  insulin lispro (HumaLOG) corrective regimen sliding scale   SubCutaneous every 6 hours    MEDICATIONS  (PRN):  dextrose 40% Gel 15 Gram(s) Oral once PRN Blood Glucose LESS THAN 70 milliGRAM(s)/deciliter  glucagon  Injectable 1 milliGRAM(s) IntraMuscular once PRN Glucose LESS THAN 70 milligrams/deciliter      ALLERGIES:  Allergies    Allergy Status Unknown    Intolerances        LABS:                        10.6   9.72  )-----------( 268      ( 27 Sep 2019 03:38 )             32.5     09-27    141  |  106  |  28<H>  ----------------------------<  122<H>  4.2   |  23  |  0.96    Ca    9.0      27 Sep 2019 03:38  Mg     .9     09-27    TPro  6.2  /  Alb  3.5  /  TBili  0.4  /  DBili  x   /  AST  15  /  ALT  17  /  AlkPhos  71  09-27    PT/INR - ( 27 Sep 2019 03:38 )   PT: 14.5 sec;   INR: 1.27          PTT - ( 27 Sep 2019 03:38 )  PTT:136.8 sec    CAPILLARY BLOOD GLUCOSE      POCT Blood Glucose.: 136 mg/dL (27 Sep 2019 05:58)      RADIOLOGY & ADDITIONAL TESTS: Reviewed.    < from: US Duplex Venous Lower Ext Complete, Bilateral (09.26.19 @ 21:40) >  IMPRESSION:  Right popliteal and posterior tibial calf vein thrombosis as well as   bilateral intramuscular calf vein thrombosis.    < end of copied text >

## 2019-09-27 NOTE — PROVIDER CONTACT NOTE (CRITICAL VALUE NOTIFICATION) - RECOMMENDATIONS
Informed MD Convissar of PTT results, instructed to turn off heparin infusion, redraw PTT at 0330 hours and await result prior to continuing the infusion. Therefore, heparin infusion stopped, labs to be drawn as instructed.
Will give magnesium supplement.

## 2019-09-28 DIAGNOSIS — I10 ESSENTIAL (PRIMARY) HYPERTENSION: ICD-10-CM

## 2019-09-28 DIAGNOSIS — E13.9 OTHER SPECIFIED DIABETES MELLITUS WITHOUT COMPLICATIONS: ICD-10-CM

## 2019-09-28 DIAGNOSIS — I82.439 ACUTE EMBOLISM AND THROMBOSIS OF UNSPECIFIED POPLITEAL VEIN: ICD-10-CM

## 2019-09-28 DIAGNOSIS — R63.8 OTHER SYMPTOMS AND SIGNS CONCERNING FOOD AND FLUID INTAKE: ICD-10-CM

## 2019-09-28 DIAGNOSIS — Z91.89 OTHER SPECIFIED PERSONAL RISK FACTORS, NOT ELSEWHERE CLASSIFIED: ICD-10-CM

## 2019-09-28 LAB
ALBUMIN SERPL ELPH-MCNC: 3.4 G/DL — SIGNIFICANT CHANGE UP (ref 3.3–5)
ALP SERPL-CCNC: 76 U/L — SIGNIFICANT CHANGE UP (ref 40–120)
ALT FLD-CCNC: 14 U/L — SIGNIFICANT CHANGE UP (ref 10–45)
ANION GAP SERPL CALC-SCNC: 12 MMOL/L — SIGNIFICANT CHANGE UP (ref 5–17)
APTT BLD: 58.3 SEC — HIGH (ref 27.5–36.3)
APTT BLD: 66.4 SEC — HIGH (ref 27.5–36.3)
APTT BLD: 67.4 SEC — HIGH (ref 27.5–36.3)
AST SERPL-CCNC: 13 U/L — SIGNIFICANT CHANGE UP (ref 10–40)
BILIRUB SERPL-MCNC: 0.5 MG/DL — SIGNIFICANT CHANGE UP (ref 0.2–1.2)
BUN SERPL-MCNC: 25 MG/DL — HIGH (ref 7–23)
CALCIUM SERPL-MCNC: 8.8 MG/DL — SIGNIFICANT CHANGE UP (ref 8.4–10.5)
CHLORIDE SERPL-SCNC: 103 MMOL/L — SIGNIFICANT CHANGE UP (ref 96–108)
CO2 SERPL-SCNC: 23 MMOL/L — SIGNIFICANT CHANGE UP (ref 22–31)
CREAT SERPL-MCNC: 0.94 MG/DL — SIGNIFICANT CHANGE UP (ref 0.5–1.3)
GLUCOSE BLDC GLUCOMTR-MCNC: 124 MG/DL — HIGH (ref 70–99)
GLUCOSE BLDC GLUCOMTR-MCNC: 144 MG/DL — HIGH (ref 70–99)
GLUCOSE BLDC GLUCOMTR-MCNC: 145 MG/DL — HIGH (ref 70–99)
GLUCOSE SERPL-MCNC: 131 MG/DL — HIGH (ref 70–99)
HCT VFR BLD CALC: 33.4 % — LOW (ref 34.5–45)
HGB BLD-MCNC: 10.8 G/DL — LOW (ref 11.5–15.5)
INR BLD: 1.07 — SIGNIFICANT CHANGE UP (ref 0.88–1.16)
MAGNESIUM SERPL-MCNC: 1.9 MG/DL — SIGNIFICANT CHANGE UP (ref 1.6–2.6)
MCHC RBC-ENTMCNC: 29.4 PG — SIGNIFICANT CHANGE UP (ref 27–34)
MCHC RBC-ENTMCNC: 32.3 GM/DL — SIGNIFICANT CHANGE UP (ref 32–36)
MCV RBC AUTO: 91 FL — SIGNIFICANT CHANGE UP (ref 80–100)
NRBC # BLD: 0 /100 WBCS — SIGNIFICANT CHANGE UP (ref 0–0)
PLATELET # BLD AUTO: 266 K/UL — SIGNIFICANT CHANGE UP (ref 150–400)
POTASSIUM SERPL-MCNC: 4.2 MMOL/L — SIGNIFICANT CHANGE UP (ref 3.5–5.3)
POTASSIUM SERPL-SCNC: 4.2 MMOL/L — SIGNIFICANT CHANGE UP (ref 3.5–5.3)
PROT SERPL-MCNC: 6.1 G/DL — SIGNIFICANT CHANGE UP (ref 6–8.3)
PROTHROM AB SERPL-ACNC: 12.1 SEC — SIGNIFICANT CHANGE UP (ref 10–12.9)
RBC # BLD: 3.67 M/UL — LOW (ref 3.8–5.2)
RBC # FLD: 13.7 % — SIGNIFICANT CHANGE UP (ref 10.3–14.5)
SODIUM SERPL-SCNC: 138 MMOL/L — SIGNIFICANT CHANGE UP (ref 135–145)
WBC # BLD: 8.31 K/UL — SIGNIFICANT CHANGE UP (ref 3.8–10.5)
WBC # FLD AUTO: 8.31 K/UL — SIGNIFICANT CHANGE UP (ref 3.8–10.5)

## 2019-09-28 PROCEDURE — 71045 X-RAY EXAM CHEST 1 VIEW: CPT | Mod: 26

## 2019-09-28 PROCEDURE — 99291 CRITICAL CARE FIRST HOUR: CPT

## 2019-09-28 PROCEDURE — 99233 SBSQ HOSP IP/OBS HIGH 50: CPT

## 2019-09-28 RX ORDER — HEPARIN SODIUM 5000 [USP'U]/ML
900 INJECTION INTRAVENOUS; SUBCUTANEOUS
Qty: 25000 | Refills: 0 | Status: DISCONTINUED | OUTPATIENT
Start: 2019-09-28 | End: 2019-09-28

## 2019-09-28 RX ORDER — APIXABAN 2.5 MG/1
10 TABLET, FILM COATED ORAL EVERY 12 HOURS
Refills: 0 | Status: DISCONTINUED | OUTPATIENT
Start: 2019-09-28 | End: 2019-09-30

## 2019-09-28 RX ORDER — MAGNESIUM SULFATE 500 MG/ML
1 VIAL (ML) INJECTION ONCE
Refills: 0 | Status: COMPLETED | OUTPATIENT
Start: 2019-09-28 | End: 2019-09-28

## 2019-09-28 RX ADMIN — Medication 2: at 00:11

## 2019-09-28 RX ADMIN — HEPARIN SODIUM 9 UNIT(S)/HR: 5000 INJECTION INTRAVENOUS; SUBCUTANEOUS at 07:42

## 2019-09-28 RX ADMIN — APIXABAN 10 MILLIGRAM(S): 2.5 TABLET, FILM COATED ORAL at 15:26

## 2019-09-28 RX ADMIN — Medication 100 GRAM(S): at 08:57

## 2019-09-28 RX ADMIN — CHLORHEXIDINE GLUCONATE 1 APPLICATION(S): 213 SOLUTION TOPICAL at 08:11

## 2019-09-28 NOTE — PROGRESS NOTE ADULT - SUBJECTIVE AND OBJECTIVE BOX
Hospital Course Transfer Note:     86 y/o F with PMH of HTN, Prediabetes, s/p cholecystectomy 8/23/19 presenting from [facility] with bilateral PE extending from right and left main pulmonary arteries into segmental and subsegmental branches admitted for possible catheter directed intervention for PE. Patient states that she has had shortness of breath for the last week that got worse last night. She has not been able to walk to the bathroom without getting short of breath the past 2 days. She states that she was much more active prior to her surgery, but admits that she has been less active as she is recovering from her surgery. She presented to [facility] with tachypnea and tachycardia, but her lungs were clear and she had no leg swelling. Her troponins were found to be elevated. On imaging she was found to have bilateral PE. She also had reflux of contrast into the IVC significant for right heart strain. She received lovenox at 1:30pm and after started on a heparin drip. Vascular surgery was consulted and determined that a catheter directed tpa was not necessary and could be medically managed. She was then transitioned to Eliquis.         SUBJECTIVE / INTERVAL HPI: Patient seen and examined at bedside.     VITAL SIGNS:  Vital Signs Last 24 Hrs  T(C): 36.9 (28 Sep 2019 17:49), Max: 37.5 (27 Sep 2019 22:00)  T(F): 98.5 (28 Sep 2019 17:49), Max: 99.5 (27 Sep 2019 22:00)  HR: 88 (28 Sep 2019 18:00) (76 - 98)  BP: 113/79 (28 Sep 2019 18:00) (93/60 - 138/80)  BP(mean): 94 (28 Sep 2019 18:00) (69 - 107)  RR: 28 (28 Sep 2019 18:00) (23 - 35)  SpO2: 95% (28 Sep 2019 18:00) (95% - 98%)    REVIEW OF SYSTEMS:    CONSTITUTIONAL: No weakness, fevers or chills.   EYES/ENT: No visual changes;  No vertigo or throat pain   NECK: No pain or stiffness  RESPIRATORY: No cough, wheezing, hemoptysis; No shortness of breath  CARDIOVASCULAR: No chest pain or palpitations  GASTROINTESTINAL: No abdominal or epigastric pain. No nausea, vomiting, or hematemesis; No diarrhea or constipation. No melena or hematochezia.  GENITOURINARY: No dysuria, frequency or hematuria  NEUROLOGICAL: No numbness or weakness  SKIN: No itching, burning, rashes, or lesions   All other review of systems is negative unless indicated above.    PHYSICAL EXAM:  General: WDWN  HEENT: NC/AT; PERRL, clear conjunctiva  Neck: supple, no JVD,   Cardiovascular: +S1/S2; RRR, no M/R/G  Respiratory: CTA b/l; no W/R/R  Gastrointestinal: soft, NT/ND; +BSx4  Extremities: WWP; 2+ peripheral pulses; no edema   Neurological: AAOx3; no focal deficits    MEDICATIONS:  MEDICATIONS  (STANDING):  apixaban 10 milliGRAM(s) Oral every 12 hours  chlorhexidine 2% Cloths 1 Application(s) Topical daily  dextrose 5%. 1000 milliLiter(s) (50 mL/Hr) IV Continuous <Continuous>  dextrose 50% Injectable 12.5 Gram(s) IV Push once  dextrose 50% Injectable 25 Gram(s) IV Push once  dextrose 50% Injectable 25 Gram(s) IV Push once  influenza   Vaccine 0.5 milliLiter(s) IntraMuscular once  insulin lispro (HumaLOG) corrective regimen sliding scale   SubCutaneous every 6 hours    MEDICATIONS  (PRN):  dextrose 40% Gel 15 Gram(s) Oral once PRN Blood Glucose LESS THAN 70 milliGRAM(s)/deciliter  glucagon  Injectable 1 milliGRAM(s) IntraMuscular once PRN Glucose LESS THAN 70 milligrams/deciliter      ALLERGIES:  Allergies    Allergy Status Unknown    Intolerances        LABS:                        10.8   8.31  )-----------( 266      ( 28 Sep 2019 05:59 )             33.4     09-28    138  |  103  |  25<H>  ----------------------------<  131<H>  4.2   |  23  |  0.94    Ca    8.8      28 Sep 2019 05:59  Phos  3.3     09-27  Mg     1.9     09-28    TPro  6.1  /  Alb  3.4  /  TBili  0.5  /  DBili  x   /  AST  13  /  ALT  14  /  AlkPhos  76  09-28    PT/INR - ( 28 Sep 2019 05:59 )   PT: 12.1 sec;   INR: 1.07          PTT - ( 28 Sep 2019 12:21 )  PTT:66.4 sec    CAPILLARY BLOOD GLUCOSE      POCT Blood Glucose.: 144 mg/dL (28 Sep 2019 16:39)      RADIOLOGY & ADDITIONAL TESTS: Reviewed. Hospital Course Transfer Note:     86 y/o F with PMH of HTN, Prediabetes, s/p cholecystectomy 8/23/19 presenting from [facility] with bilateral PE extending from right and left main pulmonary arteries into segmental and subsegmental branches admitted for possible catheter directed intervention for PE. Patient states that she has had shortness of breath for the last week that got worse last night. She has not been able to walk to the bathroom without getting short of breath the past 2 days. She states that she was much more active prior to her surgery, but admits that she has been less active as she is recovering from her surgery. She presented to [facility] with tachypnea and tachycardia, but her lungs were clear and she had no leg swelling. Her troponins were found to be elevated. On imaging she was found to have bilateral PE. She also had reflux of contrast into the IVC significant for right heart strain. She received lovenox at 1:30pm and after started on a heparin drip. Vascular surgery was consulted and determined that a catheter directed tpa was not necessary and could be medically managed. She was then transitioned to Eliquis. She was still SOB when walking, but not hypoxic.     SUBJECTIVE / INTERVAL HPI: Patient seen and examined at bedside. Patient denies any acute complaints and is resting comfortably.     VITAL SIGNS:  Vital Signs Last 24 Hrs  T(C): 36.9 (28 Sep 2019 17:49), Max: 37.5 (27 Sep 2019 22:00)  T(F): 98.5 (28 Sep 2019 17:49), Max: 99.5 (27 Sep 2019 22:00)  HR: 88 (28 Sep 2019 18:00) (76 - 98)  BP: 113/79 (28 Sep 2019 18:00) (93/60 - 138/80)  BP(mean): 94 (28 Sep 2019 18:00) (69 - 107)  RR: 28 (28 Sep 2019 18:00) (23 - 35)  SpO2: 95% (28 Sep 2019 18:00) (95% - 98%)    REVIEW OF SYSTEMS:    CONSTITUTIONAL: No weakness, fevers or chills.   EYES/ENT: No visual changes;  No vertigo or throat pain   NECK: No pain or stiffness  RESPIRATORY: No cough, wheezing, hemoptysis; No shortness of breath  CARDIOVASCULAR: No chest pain or palpitations  GASTROINTESTINAL: No abdominal or epigastric pain. No nausea, vomiting, or hematemesis; No diarrhea or constipation. No melena or hematochezia.  GENITOURINARY: No dysuria, frequency or hematuria  NEUROLOGICAL: No numbness or weakness  SKIN: No itching, burning, rashes, or lesions   All other review of systems is negative unless indicated above.    PHYSICAL EXAM:  General: WDWN  HEENT: NC/AT; PERRL, clear conjunctiva  Neck: supple, no JVD,   Cardiovascular: +S1/S2; RRR, no M/R/G  Respiratory: CTA b/l; no W/R/R  Gastrointestinal: soft, NT/ND; +BSx4  Extremities: WWP; 2+ peripheral pulses; no edema,  Neurological: AAOx3; no focal deficits    MEDICATIONS:  MEDICATIONS  (STANDING):  apixaban 10 milliGRAM(s) Oral every 12 hours  chlorhexidine 2% Cloths 1 Application(s) Topical daily  dextrose 5%. 1000 milliLiter(s) (50 mL/Hr) IV Continuous <Continuous>  dextrose 50% Injectable 12.5 Gram(s) IV Push once  dextrose 50% Injectable 25 Gram(s) IV Push once  dextrose 50% Injectable 25 Gram(s) IV Push once  influenza   Vaccine 0.5 milliLiter(s) IntraMuscular once  insulin lispro (HumaLOG) corrective regimen sliding scale   SubCutaneous every 6 hours    MEDICATIONS  (PRN):  dextrose 40% Gel 15 Gram(s) Oral once PRN Blood Glucose LESS THAN 70 milliGRAM(s)/deciliter  glucagon  Injectable 1 milliGRAM(s) IntraMuscular once PRN Glucose LESS THAN 70 milligrams/deciliter      ALLERGIES:  Allergies    Allergy Status Unknown    Intolerances        LABS:                        10.8   8.31  )-----------( 266      ( 28 Sep 2019 05:59 )             33.4     09-28    138  |  103  |  25<H>  ----------------------------<  131<H>  4.2   |  23  |  0.94    Ca    8.8      28 Sep 2019 05:59  Phos  3.3     09-27  Mg     1.9     09-28    TPro  6.1  /  Alb  3.4  /  TBili  0.5  /  DBili  x   /  AST  13  /  ALT  14  /  AlkPhos  76  09-28    PT/INR - ( 28 Sep 2019 05:59 )   PT: 12.1 sec;   INR: 1.07          PTT - ( 28 Sep 2019 12:21 )  PTT:66.4 sec    CAPILLARY BLOOD GLUCOSE      POCT Blood Glucose.: 144 mg/dL (28 Sep 2019 16:39)      RADIOLOGY & ADDITIONAL TESTS: Reviewed. Hospital Course Transfer Note:     88 y/o F with PMH of HTN, Prediabetes, s/p cholecystectomy 8/23/19 presenting from [facility] with bilateral PE extending from right and left main pulmonary arteries into segmental and subsegmental branches admitted for possible catheter directed intervention for PE. Patient states that she has had shortness of breath for the last week that got worse last night. She has not been able to walk to the bathroom without getting short of breath the past 2 days. She states that she was much more active prior to her surgery, but admits that she has been less active as she is recovering from her surgery. She presented to [facility] with tachypnea and tachycardia, but her lungs were clear and she had no leg swelling. Her troponins were found to be elevated. On imaging she was found to have bilateral PE. She also had reflux of contrast into the IVC significant for right heart strain. She received lovenox at 1:30pm and after started on a heparin drip. Vascular surgery was consulted and determined that a catheter directed tpa was not necessary and could be medically managed. She was then transitioned to Eliquis. She was still SOB when walking, but not hypoxic.     SUBJECTIVE / INTERVAL HPI: Patient seen and examined at bedside. Patient denies any acute complaints and is resting comfortably.     VITAL SIGNS:  Vital Signs Last 24 Hrs  T(C): 36.9 (28 Sep 2019 17:49), Max: 37.5 (27 Sep 2019 22:00)  T(F): 98.5 (28 Sep 2019 17:49), Max: 99.5 (27 Sep 2019 22:00)  HR: 88 (28 Sep 2019 18:00) (76 - 98)  BP: 113/79 (28 Sep 2019 18:00) (93/60 - 138/80)  BP(mean): 94 (28 Sep 2019 18:00) (69 - 107)  RR: 28 (28 Sep 2019 18:00) (23 - 35)  SpO2: 95% (28 Sep 2019 18:00) (95% - 98%)    REVIEW OF SYSTEMS:    CONSTITUTIONAL: No weakness, fevers or chills.   EYES/ENT: No visual changes;  No vertigo or throat pain   NECK: No pain or stiffness  RESPIRATORY: No cough, wheezing, hemoptysis; No shortness of breath  CARDIOVASCULAR: No chest pain or palpitations  GASTROINTESTINAL: No abdominal or epigastric pain. No nausea, vomiting, or hematemesis; No diarrhea or constipation. No melena or hematochezia.  GENITOURINARY: No dysuria, frequency or hematuria  NEUROLOGICAL: No numbness or weakness  SKIN: No itching, burning, rashes, or lesions   All other review of systems is negative unless indicated above.    PHYSICAL EXAM:  General: WDWN  HEENT: NC/AT; PERRL, clear conjunctiva  Neck: supple, no JVD,   Cardiovascular: +S1/S2; RRR, no M/R/G  Respiratory: CTA b/l; no W/R/R  Gastrointestinal: soft, NT/ND; +BSx4  Extremities: WWP; 2+ peripheral pulses; no edema,  Neurological: AAOx3; no focal deficits    MEDICATIONS:  MEDICATIONS  (STANDING):  apixaban 10 milliGRAM(s) Oral every 12 hours  chlorhexidine 2% Cloths 1 Application(s) Topical daily  dextrose 5%. 1000 milliLiter(s) (50 mL/Hr) IV Continuous <Continuous>  dextrose 50% Injectable 12.5 Gram(s) IV Push once  dextrose 50% Injectable 25 Gram(s) IV Push once  dextrose 50% Injectable 25 Gram(s) IV Push once  influenza   Vaccine 0.5 milliLiter(s) IntraMuscular once  insulin lispro (HumaLOG) corrective regimen sliding scale   SubCutaneous every 6 hours    MEDICATIONS  (PRN):  dextrose 40% Gel 15 Gram(s) Oral once PRN Blood Glucose LESS THAN 70 milliGRAM(s)/deciliter  glucagon  Injectable 1 milliGRAM(s) IntraMuscular once PRN Glucose LESS THAN 70 milligrams/deciliter    Home Medications:  Aldactone 25 mg oral tablet: 1 application orally once a day (26 Sep 2019 18:29)  Coreg 12.5 mg oral tablet: 1 tab(s) orally 2 times a day (26 Sep 2019 18:28)  Cozaar 100 mg oral tablet: 1 tab(s) orally once a day (26 Sep 2019 18:29)  folic acid 1 mg oral tablet: 1 tab(s) orally once a day (26 Sep 2019 18:28)  hydroCHLOROthiazide 25 mg oral tablet: 1 tab(s) orally once a day (26 Sep 2019 18:28)  metFORMIN 500 mg oral tablet: 1 tab(s) orally 2 times a day (26 Sep 2019 18:27)  PriLOSEC 20 mg oral delayed release capsule: 1 cap(s) orally once a day (26 Sep 2019 18:29)    ALLERGIES:  Allergies    Allergy Status Unknown    Intolerances        LABS:                        10.8   8.31  )-----------( 266      ( 28 Sep 2019 05:59 )             33.4     09-28    138  |  103  |  25<H>  ----------------------------<  131<H>  4.2   |  23  |  0.94    Ca    8.8      28 Sep 2019 05:59  Phos  3.3     09-27  Mg     1.9     09-28    TPro  6.1  /  Alb  3.4  /  TBili  0.5  /  DBili  x   /  AST  13  /  ALT  14  /  AlkPhos  76  09-28    PT/INR - ( 28 Sep 2019 05:59 )   PT: 12.1 sec;   INR: 1.07          PTT - ( 28 Sep 2019 12:21 )  PTT:66.4 sec    CAPILLARY BLOOD GLUCOSE      POCT Blood Glucose.: 144 mg/dL (28 Sep 2019 16:39)      RADIOLOGY & ADDITIONAL TESTS: Reviewed.

## 2019-09-28 NOTE — PROGRESS NOTE ADULT - ATTENDING COMMENTS
Patient seen and examined with house-staff during bedside rounds.  Resident note read, including vitals, physical findings, laboratory data, and radiological reports.   Revisions included below.  Direct personal management at bed side and extensive interpretation of the data.  Plan was outlined and discussed in details with the housestaff.  Decision making of high complexity  Action taken for acute disease activity to reflect the level of care provided:  - medication reconciliation  - review laboratory data  Patient was ambulated early on became tachypneic tachycardic and hypoxic. Patient is anticoagulated on heparin. The venous Doppler was positive for DVT. Echocardiogram was consistent with acute right heart failure with right ventricular strain and pulmonary hypertension. 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 Will continue heparin for 24 hours and will evaluate tomorrow by ambulating the patient. I discussed the case in details with the family.  Patient has high risk submassive pulmonary emboli with arcuate one heart failure with pulmonary hypertension and DVT
Provoked segmental/subsegmental submassive PE with right heart strain. Hypoxemia improving daily. Transition from hep gtt to PO NOAC. Follow up with Dr. Yara Maldonado as outpatient. Downgrade to [facility].
Patient seen and examined at bedside. Agree with the history, physical exam, assessment, and plan as outline above with the following addendum. Briefly patient is a 88 YO F h/o HTN, prediabetes, Breast Ca (Dx 2014) s/p lumpectomy and radiation, s/p cholecystectomy transferred from [facility] for submassive PE and evaluation for catheter directed therapy. Pt was evaluated by the PERT team who determined that catheter directed TPA was not indicated. Patient has been managed medically in the MICU with initial heparin gtt, now transitioned to Eliquis. Pt currently has no complaints, denies fever, chills, CP,  SOB, abdominal  pain, nausea, vomiting, melena, hematochezia. Tolerating diet. Transferred to [facility] for further management. VSS. Physical exam per above. Labs, imaging, and ECHO reviewed.     A/P:    1. Submassive PE/Acute DVT  - Appreciate PERT team recs  - ECHO consistent with R-heart strain 2/2 PE  - Patient also found to have a DVT in the popliteal vein, likely provoked in the setting of recent surgery  - C/w Eliquis  - Wean O2 as tolerated. PT to evaluate  - Follow-up with Dr. Maldonado as outpatient in 1 mos for AC management and re-evaluation    2. HTN:  - BP stable  - Hold home anti-hypertensives  - Re-initiate as indicated    3. DM  - Hold home metormin  - ISS  - Monitor BG    4. Anemia  - H/H stable, no e/o acute GI bleeding  - Monitor H/H

## 2019-09-28 NOTE — PROGRESS NOTE ADULT - PROBLEM SELECTOR PLAN 2
On home losartan 100 mg, Coreg 12.5 mg, and Hydrocholrothiazide 25 mg   - Holding home hypertensives because normotensive

## 2019-09-28 NOTE — PROGRESS NOTE ADULT - ASSESSMENT
88 y/o F with PMH of HTN, Prediabetes, s/p cholecystectomy 8/23/19 presenting from [facility] with bilateral PE extending from right and left main pulmonary arteries into segmental and subsegmental branches admitted for possible catheter directed intervention for PE.    NEURO  -Patient AAO      CARDIAC    #Troponemia  -troponin of 0.04 on admission, likely 2/2 to PE  -downtrending overnight at 0.02  -Resolved    #HTN  -hold home BP meds as patient is normotensive    #Acute Right Heart Failure  -Patient has right heart strain from her bilateral pulmonary emboli    PULMONARY    #Bilateral PE  -Patient has right heart strain on bedside echo  -Was given Lovenox at 1:30pm  -Heparin drip decreased to 9mL/hr as PTT was elevated over 100  -F/u LE Dopplers  -6 minute ambulation test failed today, will repeat tomorrow  -consider catheter directed tPA    ENDO    #Prediabetes  -Patient on metformin at home  -mISS  -A1c 6.9    GI  -No active issues at this time    RENAL  -No active issue    F: None indicated  E: Replete when K<4 and Mg<2  N: DASH Consistent Carb  D: Heparin bolus and drip  Disposition: MICU 86 y/o F with PMH of HTN, Prediabetes, s/p cholecystectomy 8/23/19 presenting from [facility] with bilateral PE extending from right and left main pulmonary arteries into segmental and subsegmental branches admitted for possible catheter directed intervention for PE.    NEURO  -Patient AAO      CARDIAC    #Acute Right Heart Failure  -Patient has right heart strain from her bilateral pulmonary emboli    #Troponemia  -troponin of 0.04 on admission, likely 2/2 to PE  -downtrended to 0.02  -Resolved    #HTN  -hold home BP meds as patient is normotensive      PULMONARY    #Bilateral PE  -Patient has right heart strain on bedside echo  -LE Dopplers, showed bilateral DVT  -6 minute ambulation test passed today, without desaturation  -will not receive catheter directed tPA  -stopped Heparin, started eliquis on 9/28  -F/u with Dr. Maldonado in 1 month      ENDO    #Diabetes  -Patient stated she was prediabetic and on metformin at home  -mISS  -A1c was 6.9, patient is diabetic    GI  -No active issues at this time    RENAL  -No active issue    F: None indicated  E: Replete when K<4 and Mg<2  N: DASH Consistent Carb Diet  D: Eliquis  Disposition: 4UR

## 2019-09-28 NOTE — PROGRESS NOTE ADULT - PROBLEM SELECTOR PLAN 1
Bilateral PE extending from right and left main pulmonary arteries into segmental and subsegmental branches, provoked because patient had a cholecystectomy a month ago. Mild troponinemia on presentation to 0.04 that resolved. Echo shows pulmonary artery pressure at 37.5 mmg, and some evidence of right heart dilation. On 2L Oxygen for symptomatic SOB, that patient says is improving, non-hypoxic even during ambulation   - Vascular determined no intervention needed   - Eliquis 5 mg BID transitioned from heparin   - PT consult because patient is getting SOB when walking

## 2019-09-28 NOTE — PROGRESS NOTE ADULT - ASSESSMENT
88 y/o F with PMH of HTN, Prediabetes, s/p cholecystectomy 8/23/19 presenting from [facility] with bilateral PE extending from right and left main pulmonary arteries into segmental and subsegmental branches admitted for possible catheter directed intervention for PE but now will be medically managed with Heparin

## 2019-09-28 NOTE — PROGRESS NOTE ADULT - SUBJECTIVE AND OBJECTIVE BOX
OVERNIGHT EVENTS: Patient's PTT was elevated over 200 and Heparin drip was decreased.    INTERVAL HPI: Patient seen and examined this AM. She denies having any complaints. Discussed options for treatment (continue heparin vs tPA) and she will make a decision tomorrow.    VITAL SIGNS:  ICU Vital Signs Last 24 Hrs  T(C): 36.8 (28 Sep 2019 05:00), Max: 37.5 (27 Sep 2019 22:00)  T(F): 98.3 (28 Sep 2019 05:00), Max: 99.5 (27 Sep 2019 22:00)  HR: 86 (28 Sep 2019 08:00) (76 - 98)  BP: 137/78 (28 Sep 2019 08:00) (93/60 - 144/88)  BP(mean): 94 (28 Sep 2019 08:00) (69 - 117)  RR: 27 (28 Sep 2019 08:00) (23 - 34)  SpO2: 98% (28 Sep 2019 08:00) (94% - 98%)      I&O's Summary    27 Sep 2019 07:01  -  28 Sep 2019 07:00  --------------------------------------------------------  IN: 671 mL / OUT: 800 mL / NET: -129 mL    28 Sep 2019 07:01  -  28 Sep 2019 08:26  --------------------------------------------------------  IN: 9 mL / OUT: 0 mL / NET: 9 mL              PHYSICAL EXAM:    General: in NAD, lying comfortably in bed  HEENT: NC/AT; PERRL, anicteric sclera; MMM  Neck: supple  Cardiovascular: +S1/S2, RRR, no M/R/G  Respiratory: clear to auscultation B/L; W/R/R  Gastrointestinal: soft, NT/ND; +BSx4  Extremities: WWP; no edema, clubbing or cyanosis  Vascular: 2+ radial, DP/PT pulses B/L  Neurological: AAOx3; no focal deficits    MEDICATIONS:  MEDICATIONS  (STANDING):  chlorhexidine 2% Cloths 1 Application(s) Topical daily  dextrose 5%. 1000 milliLiter(s) (50 mL/Hr) IV Continuous <Continuous>  dextrose 50% Injectable 12.5 Gram(s) IV Push once  dextrose 50% Injectable 25 Gram(s) IV Push once  dextrose 50% Injectable 25 Gram(s) IV Push once  heparin  Infusion 1000 Unit(s)/Hr (10 mL/Hr) IV Continuous <Continuous>  influenza   Vaccine 0.5 milliLiter(s) IntraMuscular once  insulin lispro (HumaLOG) corrective regimen sliding scale   SubCutaneous every 6 hours    MEDICATIONS  (PRN):  dextrose 40% Gel 15 Gram(s) Oral once PRN Blood Glucose LESS THAN 70 milliGRAM(s)/deciliter  glucagon  Injectable 1 milliGRAM(s) IntraMuscular once PRN Glucose LESS THAN 70 milligrams/deciliter      ALLERGIES:  Allergies    Allergy Status Unknown    Intolerances        LABS:                        10.6   9.72  )-----------( 268      ( 27 Sep 2019 03:38 )             32.5     09-27    141  |  106  |  28<H>  ----------------------------<  122<H>  4.2   |  23  |  0.96    Ca    9.0      27 Sep 2019 03:38  Mg     .9     09-27    TPro  6.2  /  Alb  3.5  /  TBili  0.4  /  DBili  x   /  AST  15  /  ALT  17  /  AlkPhos  71  09-27    PT/INR - ( 27 Sep 2019 03:38 )   PT: 14.5 sec;   INR: 1.27          PTT - ( 27 Sep 2019 03:38 )  PTT:136.8 sec    CAPILLARY BLOOD GLUCOSE      POCT Blood Glucose.: 136 mg/dL (27 Sep 2019 05:58)      RADIOLOGY & ADDITIONAL TESTS: Reviewed. TRANSFER NOTE MICU to [facility]    HOSPITAL COURSE:  86 y/o F with PMH of HTN, Diabetes, s/p cholecystectomy 8/23/19 presenting from [facility] with bilateral PE extending from right and left main pulmonary arteries into segmental and subsegmental branches admitted for possible catheter directed intervention for PE. Patient states that she has had shortness of breath for the last week that got worse last night. She has not been able to walk to the bathroom without getting short of breath the past 2 days. She states that she was much more active prior to her surgery, but admits that she has been less active as she is recovering from her surgery. She presented to [facility] with tachypnea and tachycardia, but her lungs were clear and she had no leg swelling. Her troponins were found to be elevated. On imaging she was found to have bilateral PE. She also had reflux of contrast into the IVC significant for right heart strain. She received lovenox at 1:30pm at [facility]. Patient ED vitals were T: 97.1 F | HR: 100 | BP: 132/80 | RR: 26 | SpO2: 97% on RA. Labs were significant for troponin of 0.04, BNP 7161. CT Chest showed bilateral PE at [facility]. EKG showed sinus tachycardia, no ischemic changes. Patient was transferred here and started on a Heparin drip 9/26/19. Patient completed AM 6 minute walk test on 9/27, but desaturated. Today, 9/28, patient completed 6 minute walk test without desaturating. Heparin was stopped, started patient on eliquis. Patient will need to follow up with Dr. Maldonado in 1 month. Patient stable for stepdown to [facility].        OVERNIGHT EVENTS: Patient's PTT was elevated over 200 and Heparin drip was decreased.    INTERVAL HPI: Patient seen and examined this AM. She denies having any complaints. Discussed options for treatment (continue heparin vs tPA) and she will make a decision tomorrow.    VITAL SIGNS:  ICU Vital Signs Last 24 Hrs  T(C): 36.8 (28 Sep 2019 05:00), Max: 37.5 (27 Sep 2019 22:00)  T(F): 98.3 (28 Sep 2019 05:00), Max: 99.5 (27 Sep 2019 22:00)  HR: 86 (28 Sep 2019 08:00) (76 - 98)  BP: 137/78 (28 Sep 2019 08:00) (93/60 - 144/88)  BP(mean): 94 (28 Sep 2019 08:00) (69 - 117)  RR: 27 (28 Sep 2019 08:00) (23 - 34)  SpO2: 98% (28 Sep 2019 08:00) (94% - 98%)      I&O's Summary    27 Sep 2019 07:01  -  28 Sep 2019 07:00  --------------------------------------------------------  IN: 671 mL / OUT: 800 mL / NET: -129 mL    28 Sep 2019 07:01  -  28 Sep 2019 08:26  --------------------------------------------------------  IN: 9 mL / OUT: 0 mL / NET: 9 mL              PHYSICAL EXAM:    General: in NAD, lying comfortably in bed  HEENT: NC/AT; PERRL, anicteric sclera; MMM  Neck: supple  Cardiovascular: +S1/S2, RRR, no M/R/G  Respiratory: clear to auscultation B/L; W/R/R  Gastrointestinal: soft, NT/ND; +BSx4  Extremities: WWP; no edema, clubbing or cyanosis  Vascular: 2+ radial, DP/PT pulses B/L  Neurological: AAOx3; no focal deficits    MEDICATIONS:  MEDICATIONS  (STANDING):  chlorhexidine 2% Cloths 1 Application(s) Topical daily  dextrose 5%. 1000 milliLiter(s) (50 mL/Hr) IV Continuous <Continuous>  dextrose 50% Injectable 12.5 Gram(s) IV Push once  dextrose 50% Injectable 25 Gram(s) IV Push once  dextrose 50% Injectable 25 Gram(s) IV Push once  heparin  Infusion 1000 Unit(s)/Hr (10 mL/Hr) IV Continuous <Continuous>  influenza   Vaccine 0.5 milliLiter(s) IntraMuscular once  insulin lispro (HumaLOG) corrective regimen sliding scale   SubCutaneous every 6 hours    MEDICATIONS  (PRN):  dextrose 40% Gel 15 Gram(s) Oral once PRN Blood Glucose LESS THAN 70 milliGRAM(s)/deciliter  glucagon  Injectable 1 milliGRAM(s) IntraMuscular once PRN Glucose LESS THAN 70 milligrams/deciliter      ALLERGIES:  Allergies    Allergy Status Unknown    Intolerances        LABS:                         10.8   8.31  )-----------( 266      ( 28 Sep 2019 05:59 )             33.4     09-28    138  |  103  |  25<H>  ----------------------------<  131<H>  4.2   |  23  |  0.94    Ca    8.8      28 Sep 2019 05:59  Phos  3.3     09-27  Mg     1.9     09-28    TPro  6.1  /  Alb  3.4  /  TBili  0.5  /  DBili  x   /  AST  13  /  ALT  14  /  AlkPhos  76  09-28    PT/INR - ( 28 Sep 2019 05:59 )   PT: 12.1 sec;   INR: 1.07          PTT - ( 28 Sep 2019 12:21 )  PTT:66.4 sec    CARDIAC MARKERS ( 27 Sep 2019 01:12 )  x     / 0.02 ng/mL / x     / x     / x      CARDIAC MARKERS ( 26 Sep 2019 18:42 )  x     / 0.04 ng/mL / x     / x     / x          CAPILLARY BLOOD GLUCOSE  POCT Blood Glucose.: 144 mg/dL (28 Sep 2019 16:39)  POCT Blood Glucose.: 124 mg/dL (28 Sep 2019 06:26)  POCT Blood Glucose.: 170 mg/dL (27 Sep 2019 23:38)  POCT Blood Glucose.: 167 mg/dL (27 Sep 2019 18:52)        RADIOLOGY, EKG & ADDITIONAL TESTS: Reviewed.   < from: US Duplex Venous Lower Ext Complete, Bilateral (09.26.19 @ 21:40) >  IMPRESSION:  Right popliteal and posterior tibial calf vein thrombosis as well as   bilateral intramuscular calf vein thrombosis.    < end of copied text >  < from: Xray Chest 1 View- PORTABLE-Routine (09.28.19 @ 07:15) >  Impression: No acute infiltrates    < end of copied text > TRANSFER NOTE MICU to [facility]    HOSPITAL COURSE:  86 y/o F with PMH of HTN, Diabetes, s/p cholecystectomy 8/23/19 presenting from [facility] with bilateral PE extending from right and left main pulmonary arteries into segmental and subsegmental branches admitted for possible catheter directed intervention for PE. Patient states that she has had shortness of breath for the last week that got worse last night. She has not been able to walk to the bathroom without getting short of breath the past 2 days. She states that she was much more active prior to her surgery, but admits that she has been less active as she is recovering from her surgery. She presented to [facility] with tachypnea and tachycardia, but her lungs were clear and she had no leg swelling. Her troponins were found to be elevated. On imaging she was found to have bilateral PE. She also had reflux of contrast into the IVC significant for right heart strain. She received lovenox at 1:30pm at [facility]. Patient ED vitals were T: 97.1 F | HR: 100 | BP: 132/80 | RR: 26 | SpO2: 97% on RA. Labs were significant for troponin of 0.04, BNP 7161. CT Chest showed bilateral PE at [facility]. EKG showed sinus tachycardia, no ischemic changes. Patient was transferred here and started on a Heparin drip 9/26/19. Patient completed AM 6 minute walk test on 9/27, but desaturated. Today, 9/28, patient completed 6 minute walk test without desaturating. Heparin was stopped, started patient on eliquis. Patient will need to follow up with Dr. Maldonado in 1 month. Patient stable for stepdown to [facility].        OVERNIGHT EVENTS: Patient was kept NPO at midnight for possible intervention. PTT was therapeutic overnight, AM PTT was 58.3 and Heparin drip was increased to 9mL/hr.    INTERVAL HPI: Patient seen and examined this AM. She denies chest pain, shortness of breath, nausea, vomiting, diarrhea, constipation, fevers, chills. She is willing to do whatever is best. After passing her 6 minute walk test this AM, it was determined that she would not need catheter guided tPA and will be transitioned to eliquis today.    VITAL SIGNS:  ICU Vital Signs Last 24 Hrs  T(C): 36.8 (28 Sep 2019 05:00), Max: 37.5 (27 Sep 2019 22:00)  T(F): 98.3 (28 Sep 2019 05:00), Max: 99.5 (27 Sep 2019 22:00)  HR: 86 (28 Sep 2019 08:00) (76 - 98)  BP: 137/78 (28 Sep 2019 08:00) (93/60 - 144/88)  BP(mean): 94 (28 Sep 2019 08:00) (69 - 117)  RR: 27 (28 Sep 2019 08:00) (23 - 34)  SpO2: 98% (28 Sep 2019 08:00) (94% - 98%)      I&O's Summary    27 Sep 2019 07:01  -  28 Sep 2019 07:00  --------------------------------------------------------  IN: 671 mL / OUT: 800 mL / NET: -129 mL    28 Sep 2019 07:01  -  28 Sep 2019 08:26  --------------------------------------------------------  IN: 9 mL / OUT: 0 mL / NET: 9 mL              PHYSICAL EXAM:    General: in NAD, lying comfortably in bed  HEENT: NC/AT; PERRL, anicteric sclera; MMM  Neck: supple  Cardiovascular: +S1/S2, RRR, no M/R/G  Respiratory: CTA B/L; no W/R/R  Gastrointestinal: soft, NT/ND; +BSx4  Extremities: WWP; no edema, clubbing or cyanosis  Vascular: 2+ radial, DP/PT pulses B/L  Neurological: AAOx3; no focal deficits    MEDICATIONS:  MEDICATIONS  (STANDING):  chlorhexidine 2% Cloths 1 Application(s) Topical daily  dextrose 5%. 1000 milliLiter(s) (50 mL/Hr) IV Continuous <Continuous>  dextrose 50% Injectable 12.5 Gram(s) IV Push once  dextrose 50% Injectable 25 Gram(s) IV Push once  dextrose 50% Injectable 25 Gram(s) IV Push once  heparin  Infusion 1000 Unit(s)/Hr (10 mL/Hr) IV Continuous <Continuous>  influenza   Vaccine 0.5 milliLiter(s) IntraMuscular once  insulin lispro (HumaLOG) corrective regimen sliding scale   SubCutaneous every 6 hours    MEDICATIONS  (PRN):  dextrose 40% Gel 15 Gram(s) Oral once PRN Blood Glucose LESS THAN 70 milliGRAM(s)/deciliter  glucagon  Injectable 1 milliGRAM(s) IntraMuscular once PRN Glucose LESS THAN 70 milligrams/deciliter      ALLERGIES:  Allergies    Allergy Status Unknown    Intolerances        LABS:                         10.8   8.31  )-----------( 266      ( 28 Sep 2019 05:59 )             33.4     09-28    138  |  103  |  25<H>  ----------------------------<  131<H>  4.2   |  23  |  0.94    Ca    8.8      28 Sep 2019 05:59  Phos  3.3     09-27  Mg     1.9     09-28    TPro  6.1  /  Alb  3.4  /  TBili  0.5  /  DBili  x   /  AST  13  /  ALT  14  /  AlkPhos  76  09-28    PT/INR - ( 28 Sep 2019 05:59 )   PT: 12.1 sec;   INR: 1.07          PTT - ( 28 Sep 2019 12:21 )  PTT:66.4 sec    CARDIAC MARKERS ( 27 Sep 2019 01:12 )  x     / 0.02 ng/mL / x     / x     / x      CARDIAC MARKERS ( 26 Sep 2019 18:42 )  x     / 0.04 ng/mL / x     / x     / x          CAPILLARY BLOOD GLUCOSE  POCT Blood Glucose.: 144 mg/dL (28 Sep 2019 16:39)  POCT Blood Glucose.: 124 mg/dL (28 Sep 2019 06:26)  POCT Blood Glucose.: 170 mg/dL (27 Sep 2019 23:38)  POCT Blood Glucose.: 167 mg/dL (27 Sep 2019 18:52)        RADIOLOGY, EKG & ADDITIONAL TESTS: Reviewed.   < from: US Duplex Venous Lower Ext Complete, Bilateral (09.26.19 @ 21:40) >  IMPRESSION:  Right popliteal and posterior tibial calf vein thrombosis as well as   bilateral intramuscular calf vein thrombosis.    < end of copied text >  < from: Xray Chest 1 View- PORTABLE-Routine (09.28.19 @ 07:15) >  Impression: No acute infiltrates    < end of copied text >

## 2019-09-29 LAB
ALBUMIN SERPL ELPH-MCNC: 3.4 G/DL — SIGNIFICANT CHANGE UP (ref 3.3–5)
ALP SERPL-CCNC: 74 U/L — SIGNIFICANT CHANGE UP (ref 40–120)
ALT FLD-CCNC: 11 U/L — SIGNIFICANT CHANGE UP (ref 10–45)
ANION GAP SERPL CALC-SCNC: 9 MMOL/L — SIGNIFICANT CHANGE UP (ref 5–17)
APTT BLD: 32 SEC — SIGNIFICANT CHANGE UP (ref 27.5–36.3)
AST SERPL-CCNC: 11 U/L — SIGNIFICANT CHANGE UP (ref 10–40)
BASOPHILS # BLD AUTO: 0.05 K/UL — SIGNIFICANT CHANGE UP (ref 0–0.2)
BASOPHILS NFR BLD AUTO: 0.6 % — SIGNIFICANT CHANGE UP (ref 0–2)
BILIRUB SERPL-MCNC: 0.4 MG/DL — SIGNIFICANT CHANGE UP (ref 0.2–1.2)
BUN SERPL-MCNC: 23 MG/DL — SIGNIFICANT CHANGE UP (ref 7–23)
CALCIUM SERPL-MCNC: 9.5 MG/DL — SIGNIFICANT CHANGE UP (ref 8.4–10.5)
CHLORIDE SERPL-SCNC: 104 MMOL/L — SIGNIFICANT CHANGE UP (ref 96–108)
CO2 SERPL-SCNC: 24 MMOL/L — SIGNIFICANT CHANGE UP (ref 22–31)
CREAT SERPL-MCNC: 1.07 MG/DL — SIGNIFICANT CHANGE UP (ref 0.5–1.3)
EOSINOPHIL # BLD AUTO: 0.22 K/UL — SIGNIFICANT CHANGE UP (ref 0–0.5)
EOSINOPHIL NFR BLD AUTO: 2.6 % — SIGNIFICANT CHANGE UP (ref 0–6)
GLUCOSE BLDC GLUCOMTR-MCNC: 132 MG/DL — HIGH (ref 70–99)
GLUCOSE BLDC GLUCOMTR-MCNC: 136 MG/DL — HIGH (ref 70–99)
GLUCOSE BLDC GLUCOMTR-MCNC: 257 MG/DL — HIGH (ref 70–99)
GLUCOSE SERPL-MCNC: 132 MG/DL — HIGH (ref 70–99)
HCT VFR BLD CALC: 34.9 % — SIGNIFICANT CHANGE UP (ref 34.5–45)
HGB BLD-MCNC: 10.9 G/DL — LOW (ref 11.5–15.5)
IMM GRANULOCYTES NFR BLD AUTO: 0.1 % — SIGNIFICANT CHANGE UP (ref 0–1.5)
INR BLD: 1.13 — SIGNIFICANT CHANGE UP (ref 0.88–1.16)
LYMPHOCYTES # BLD AUTO: 1.65 K/UL — SIGNIFICANT CHANGE UP (ref 1–3.3)
LYMPHOCYTES # BLD AUTO: 19.4 % — SIGNIFICANT CHANGE UP (ref 13–44)
MAGNESIUM SERPL-MCNC: 1.8 MG/DL — SIGNIFICANT CHANGE UP (ref 1.6–2.6)
MCHC RBC-ENTMCNC: 29.7 PG — SIGNIFICANT CHANGE UP (ref 27–34)
MCHC RBC-ENTMCNC: 31.2 GM/DL — LOW (ref 32–36)
MCV RBC AUTO: 95.1 FL — SIGNIFICANT CHANGE UP (ref 80–100)
MONOCYTES # BLD AUTO: 1.02 K/UL — HIGH (ref 0–0.9)
MONOCYTES NFR BLD AUTO: 12 % — SIGNIFICANT CHANGE UP (ref 2–14)
NEUTROPHILS # BLD AUTO: 5.57 K/UL — SIGNIFICANT CHANGE UP (ref 1.8–7.4)
NEUTROPHILS NFR BLD AUTO: 65.3 % — SIGNIFICANT CHANGE UP (ref 43–77)
NRBC # BLD: 0 /100 WBCS — SIGNIFICANT CHANGE UP (ref 0–0)
PLATELET # BLD AUTO: 284 K/UL — SIGNIFICANT CHANGE UP (ref 150–400)
POTASSIUM SERPL-MCNC: 4.3 MMOL/L — SIGNIFICANT CHANGE UP (ref 3.5–5.3)
POTASSIUM SERPL-SCNC: 4.3 MMOL/L — SIGNIFICANT CHANGE UP (ref 3.5–5.3)
PROT SERPL-MCNC: 6.2 G/DL — SIGNIFICANT CHANGE UP (ref 6–8.3)
PROTHROM AB SERPL-ACNC: 12.8 SEC — SIGNIFICANT CHANGE UP (ref 10–12.9)
RBC # BLD: 3.67 M/UL — LOW (ref 3.8–5.2)
RBC # FLD: 13.5 % — SIGNIFICANT CHANGE UP (ref 10.3–14.5)
SODIUM SERPL-SCNC: 137 MMOL/L — SIGNIFICANT CHANGE UP (ref 135–145)
WBC # BLD: 8.52 K/UL — SIGNIFICANT CHANGE UP (ref 3.8–10.5)
WBC # FLD AUTO: 8.52 K/UL — SIGNIFICANT CHANGE UP (ref 3.8–10.5)

## 2019-09-29 PROCEDURE — 99233 SBSQ HOSP IP/OBS HIGH 50: CPT

## 2019-09-29 RX ADMIN — CHLORHEXIDINE GLUCONATE 1 APPLICATION(S): 213 SOLUTION TOPICAL at 12:40

## 2019-09-29 RX ADMIN — APIXABAN 10 MILLIGRAM(S): 2.5 TABLET, FILM COATED ORAL at 06:46

## 2019-09-29 RX ADMIN — Medication 6: at 12:39

## 2019-09-29 RX ADMIN — APIXABAN 10 MILLIGRAM(S): 2.5 TABLET, FILM COATED ORAL at 18:50

## 2019-09-29 NOTE — PHYSICAL THERAPY INITIAL EVALUATION ADULT - PERTINENT HX OF CURRENT PROBLEM, REHAB EVAL
Statement Selected
Pt. is an 87 y.o female transferred from [facility] with SOB and diagnosed bilat PEs for possible intervention.

## 2019-09-29 NOTE — PROGRESS NOTE ADULT - SUBJECTIVE AND OBJECTIVE BOX
Patient is a 87y old  Female who presents with a chief complaint of Submassive PE (28 Sep 2019 18:09)    INTERVAL HPI/OVERNIGHT EVENTS: BRIAN overnight. No current complaints. Tolerated PT well.     Review of Systems: 12 point review of systems otherwise negative      MEDICATIONS  (STANDING):  apixaban 10 milliGRAM(s) Oral every 12 hours  chlorhexidine 2% Cloths 1 Application(s) Topical daily  dextrose 5%. 1000 milliLiter(s) (50 mL/Hr) IV Continuous <Continuous>  dextrose 50% Injectable 12.5 Gram(s) IV Push once  dextrose 50% Injectable 25 Gram(s) IV Push once  dextrose 50% Injectable 25 Gram(s) IV Push once  influenza   Vaccine 0.5 milliLiter(s) IntraMuscular once  insulin lispro (HumaLOG) corrective regimen sliding scale   SubCutaneous every 6 hours    MEDICATIONS  (PRN):  dextrose 40% Gel 15 Gram(s) Oral once PRN Blood Glucose LESS THAN 70 milliGRAM(s)/deciliter  glucagon  Injectable 1 milliGRAM(s) IntraMuscular once PRN Glucose LESS THAN 70 milligrams/deciliter    Allergies    Allergy Status Unknown    Intolerances    Vital Signs Last 24 Hrs  T(C): 36.8 (29 Sep 2019 10:28), Max: 36.9 (28 Sep 2019 17:49)  T(F): 98.3 (29 Sep 2019 10:28), Max: 98.5 (28 Sep 2019 17:49)  HR: 92 (29 Sep 2019 10:28) (84 - 92)  BP: 112/72 (29 Sep 2019 10:28) (112/72 - 132/71)  BP(mean): 100 (28 Sep 2019 20:00) (76 - 100)  RR: 20 (29 Sep 2019 10:28) (18 - 35)  SpO2: 94% (29 Sep 2019 10:28) (94% - 98%)  CAPILLARY BLOOD GLUCOSE      POCT Blood Glucose.: 257 mg/dL (29 Sep 2019 12:20)  POCT Blood Glucose.: 136 mg/dL (29 Sep 2019 06:15)  POCT Blood Glucose.: 145 mg/dL (28 Sep 2019 23:55)  POCT Blood Glucose.: 144 mg/dL (28 Sep 2019 16:39)      09-28 @ 07:01  -  09-29 @ 07:00  --------------------------------------------------------  IN: 872 mL / OUT: 450 mL / NET: 422 mL    Physical Exam:    General:  Well appearing, NAD, not cachetic  HEENT:  Nonicteric, PERRLA  CV:  RRR, no murmur, no JVD  Lungs:  CTA B/L, no wheezes, rales, rhonchi  Abdomen:  Soft, non-tender, no distended, positive BS, no hepatosplenomegaly  Extremities:  2+ pulses, no c/c, no edema  Skin:  Warm and dry, no rashes  Neuro:  AAOx3, non-focal  No Restraints    LABS:                        10.9   8.52  )-----------( 284      ( 29 Sep 2019 06:14 )             34.9     09-29    137  |  104  |  23  ----------------------------<  132<H>  4.3   |  24  |  1.07    Ca    9.5      29 Sep 2019 06:14  Mg     1.8     09-29    TPro  6.2  /  Alb  3.4  /  TBili  0.4  /  DBili  x   /  AST  11  /  ALT  11  /  AlkPhos  74  09-29    PT/INR - ( 29 Sep 2019 06:14 )   PT: 12.8 sec;   INR: 1.13       PTT - ( 29 Sep 2019 06:14 )  PTT:32.0 sec    RADIOLOGY & ADDITIONAL TESTS: No new radiologic studies

## 2019-09-29 NOTE — PROGRESS NOTE ADULT - ASSESSMENT
86 YO F h/o HTN, prediabetes, Breast Ca (Dx 2014) s/p lumpectomy and radiation, s/p cholecystectomy transferred from [facility] for submassive PE and evaluation for catheter directed therapy. Pt was evaluated by the PERT team who determined that catheter directed TPA was not indicated.    1. Submassive PE/Acute DVT  - Appreciate PERT team recs  - ECHO consistent with R-heart strain 2/2 PE  - Patient also found to have a DVT in the popliteal vein, likely provoked in the setting of recent surgery  - C/w Eliquis  - Wean O2 as tolerated. Follow-up PT evaluation  - Follow-up with Dr. Maldonado as outpatient in 1 mos for AC management and re-evaluation    2. HTN:  - BP stable  - Hold home anti-hypertensives  - Re-initiate as indicated    3. DM  - Hold home metformin  - ISS  - Monitor BG    4. Anemia  - H/H stable, no e/o acute GI bleeding  - Monitor H/H

## 2019-09-29 NOTE — PHYSICAL THERAPY INITIAL EVALUATION ADULT - PREDICTED DURATION OF THERAPY (DAYS/WKS), PT EVAL
Pt. would benefit from cont. PT follow up to improve gait. endurance, balance, assess negotiation of stairs.

## 2019-09-30 VITALS
DIASTOLIC BLOOD PRESSURE: 78 MMHG | OXYGEN SATURATION: 95 % | TEMPERATURE: 97 F | HEART RATE: 94 BPM | RESPIRATION RATE: 18 BRPM | SYSTOLIC BLOOD PRESSURE: 128 MMHG

## 2019-09-30 LAB
ANION GAP SERPL CALC-SCNC: 12 MMOL/L — SIGNIFICANT CHANGE UP (ref 5–17)
BUN SERPL-MCNC: 26 MG/DL — HIGH (ref 7–23)
CALCIUM SERPL-MCNC: 9.8 MG/DL — SIGNIFICANT CHANGE UP (ref 8.4–10.5)
CHLORIDE SERPL-SCNC: 105 MMOL/L — SIGNIFICANT CHANGE UP (ref 96–108)
CO2 SERPL-SCNC: 22 MMOL/L — SIGNIFICANT CHANGE UP (ref 22–31)
CREAT SERPL-MCNC: 0.99 MG/DL — SIGNIFICANT CHANGE UP (ref 0.5–1.3)
GLUCOSE BLDC GLUCOMTR-MCNC: 117 MG/DL — HIGH (ref 70–99)
GLUCOSE BLDC GLUCOMTR-MCNC: 117 MG/DL — HIGH (ref 70–99)
GLUCOSE BLDC GLUCOMTR-MCNC: 128 MG/DL — HIGH (ref 70–99)
GLUCOSE BLDC GLUCOMTR-MCNC: 284 MG/DL — HIGH (ref 70–99)
GLUCOSE SERPL-MCNC: 141 MG/DL — HIGH (ref 70–99)
HCT VFR BLD CALC: 36.7 % — SIGNIFICANT CHANGE UP (ref 34.5–45)
HGB BLD-MCNC: 11.3 G/DL — LOW (ref 11.5–15.5)
MAGNESIUM SERPL-MCNC: 1.3 MG/DL — LOW (ref 1.6–2.6)
MCHC RBC-ENTMCNC: 29.4 PG — SIGNIFICANT CHANGE UP (ref 27–34)
MCHC RBC-ENTMCNC: 30.8 GM/DL — LOW (ref 32–36)
MCV RBC AUTO: 95.3 FL — SIGNIFICANT CHANGE UP (ref 80–100)
NRBC # BLD: 0 /100 WBCS — SIGNIFICANT CHANGE UP (ref 0–0)
PLATELET # BLD AUTO: 296 K/UL — SIGNIFICANT CHANGE UP (ref 150–400)
POTASSIUM SERPL-MCNC: 4.7 MMOL/L — SIGNIFICANT CHANGE UP (ref 3.5–5.3)
POTASSIUM SERPL-SCNC: 4.7 MMOL/L — SIGNIFICANT CHANGE UP (ref 3.5–5.3)
RBC # BLD: 3.85 M/UL — SIGNIFICANT CHANGE UP (ref 3.8–5.2)
RBC # FLD: 13.4 % — SIGNIFICANT CHANGE UP (ref 10.3–14.5)
SODIUM SERPL-SCNC: 139 MMOL/L — SIGNIFICANT CHANGE UP (ref 135–145)
WBC # BLD: 9.15 K/UL — SIGNIFICANT CHANGE UP (ref 3.8–10.5)
WBC # FLD AUTO: 9.15 K/UL — SIGNIFICANT CHANGE UP (ref 3.8–10.5)

## 2019-09-30 PROCEDURE — 85027 COMPLETE CBC AUTOMATED: CPT

## 2019-09-30 PROCEDURE — 84100 ASSAY OF PHOSPHORUS: CPT

## 2019-09-30 PROCEDURE — 36415 COLL VENOUS BLD VENIPUNCTURE: CPT

## 2019-09-30 PROCEDURE — 83735 ASSAY OF MAGNESIUM: CPT

## 2019-09-30 PROCEDURE — 86901 BLOOD TYPING SEROLOGIC RH(D): CPT

## 2019-09-30 PROCEDURE — 90686 IIV4 VACC NO PRSV 0.5 ML IM: CPT

## 2019-09-30 PROCEDURE — 93005 ELECTROCARDIOGRAM TRACING: CPT

## 2019-09-30 PROCEDURE — 85730 THROMBOPLASTIN TIME PARTIAL: CPT

## 2019-09-30 PROCEDURE — 93970 EXTREMITY STUDY: CPT

## 2019-09-30 PROCEDURE — 80048 BASIC METABOLIC PNL TOTAL CA: CPT

## 2019-09-30 PROCEDURE — 97116 GAIT TRAINING THERAPY: CPT

## 2019-09-30 PROCEDURE — 83036 HEMOGLOBIN GLYCOSYLATED A1C: CPT

## 2019-09-30 PROCEDURE — 84484 ASSAY OF TROPONIN QUANT: CPT

## 2019-09-30 PROCEDURE — 86900 BLOOD TYPING SEROLOGIC ABO: CPT

## 2019-09-30 PROCEDURE — 83880 ASSAY OF NATRIURETIC PEPTIDE: CPT

## 2019-09-30 PROCEDURE — 99239 HOSP IP/OBS DSCHRG MGMT >30: CPT

## 2019-09-30 PROCEDURE — 97161 PT EVAL LOW COMPLEX 20 MIN: CPT

## 2019-09-30 PROCEDURE — 71045 X-RAY EXAM CHEST 1 VIEW: CPT

## 2019-09-30 PROCEDURE — 86850 RBC ANTIBODY SCREEN: CPT

## 2019-09-30 PROCEDURE — 85610 PROTHROMBIN TIME: CPT

## 2019-09-30 PROCEDURE — 82962 GLUCOSE BLOOD TEST: CPT

## 2019-09-30 PROCEDURE — 80053 COMPREHEN METABOLIC PANEL: CPT

## 2019-09-30 PROCEDURE — 85025 COMPLETE CBC W/AUTO DIFF WBC: CPT

## 2019-09-30 PROCEDURE — 93306 TTE W/DOPPLER COMPLETE: CPT

## 2019-09-30 RX ORDER — MAGNESIUM SULFATE 500 MG/ML
2 VIAL (ML) INJECTION ONCE
Refills: 0 | Status: DISCONTINUED | OUTPATIENT
Start: 2019-09-30 | End: 2019-09-30

## 2019-09-30 RX ORDER — APIXABAN 2.5 MG/1
1 TABLET, FILM COATED ORAL
Qty: 120 | Refills: 0
Start: 2019-09-30 | End: 2019-11-28

## 2019-09-30 RX ORDER — SPIRONOLACTONE 25 MG/1
1 TABLET, FILM COATED ORAL
Qty: 0 | Refills: 0 | DISCHARGE

## 2019-09-30 RX ORDER — APIXABAN 2.5 MG/1
1 TABLET, FILM COATED ORAL
Qty: 90 | Refills: 0
Start: 2019-09-30 | End: 2019-11-08

## 2019-09-30 RX ORDER — APIXABAN 2.5 MG/1
2 TABLET, FILM COATED ORAL
Qty: 28 | Refills: 0
Start: 2019-09-30 | End: 2019-10-06

## 2019-09-30 RX ORDER — MAGNESIUM SULFATE 500 MG/ML
2 VIAL (ML) INJECTION ONCE
Refills: 0 | Status: COMPLETED | OUTPATIENT
Start: 2019-09-30 | End: 2019-09-30

## 2019-09-30 RX ADMIN — INFLUENZA VIRUS VACCINE 0.5 MILLILITER(S): 15; 15; 15; 15 SUSPENSION INTRAMUSCULAR at 12:04

## 2019-09-30 RX ADMIN — APIXABAN 10 MILLIGRAM(S): 2.5 TABLET, FILM COATED ORAL at 06:56

## 2019-09-30 RX ADMIN — Medication 50 GRAM(S): at 12:04

## 2019-09-30 RX ADMIN — Medication 6: at 12:44

## 2019-09-30 RX ADMIN — APIXABAN 10 MILLIGRAM(S): 2.5 TABLET, FILM COATED ORAL at 17:52

## 2019-09-30 NOTE — DISCHARGE NOTE PROVIDER - NSDCCPCAREPLAN_GEN_ALL_CORE_FT
PRINCIPAL DISCHARGE DIAGNOSIS  Diagnosis: Pulmonary embolus  Assessment and Plan of Treatment: Pulmonary embolism (or "PE") is a blockage in one or more of the blood vessels that supply blood to the lungs. Most often these blockages are caused by blood clots that form elsewhere and then travel to the lungs. In rare cases, blockages can also be caused by air bubbles, tiny globs of fat, or pieces of tumor that travel to the lungs. If a blood clot forms or gets stuck inside a blood vessel, it can clog the vessel and keep blood from getting where it needs to go. When that happens in the lungs, the lungs can get damaged. Having blocked arteries in the lung can also make it hard to breathe and can even lead to death. Common symptoms include panting, shortness of breath, or trouble breathing, sharp, knife-like chest pain when you breathe in or strain, coughing or coughing up blood or simply a rapid heartbeat. If you get any of these symptoms, especially if they happen over a short period of time (hours or days) or get worse quickly, call for an ambulance. At the hospital, doctors can run tests to find out if you do have a clot. Blood clots in the lungs can lead to death. That's why it's important to act fast and find out if there is a clot. You had a PE from a clot that orginated in your legs. you had some evidence of strain to your heart so vascular was considering a procedure to break up the clot, but ultimately decided that anticoagulation was the best choice for you. Please continue to take Eliquis daily, and follow up with a cardiologist and pulmonologist after discharge.         SECONDARY DISCHARGE DIAGNOSES  Diagnosis: Diabetes  Assessment and Plan of Treatment: You have a known history of diabetes mellitus prior to your admission. This condition results from blood sugar levels getting too high because your body is more resistant to insulin. Uncontrolled blood sugar levels can lead to kidney and heart damage, pain/numbness/paralysis in your hands and feet, and increased rates of infections.  To manage this you are on a medication called Metformin. It is important that you continue to take this medication when you are discharged so that you can continue to control your blood sugar levels. Additionally be sure to follow up with your primary care physician, podiatrist, and ophthalmologist on a regular basis. Your HgbA1c which is a marker of how well your diabetes is controlled is 6.9, which means you most likely need better control of your diabetes.

## 2019-09-30 NOTE — DISCHARGE NOTE PROVIDER - CARE PROVIDER_API CALL
Eugene Robin)  Pulmonary Disease  [address] [place] [address]  Phone: [phone]  Fax: [phone]  Follow Up Time: 1 week

## 2019-09-30 NOTE — DISCHARGE NOTE PROVIDER - HOSPITAL COURSE
88 y/o F with PMH of HTN, Prediabetes, s/p cholecystectomy 8/23/19 presenting from [facility] with bilateral PE extending from right and left main pulmonary arteries into segmental and subsegmental branches admitted for possible catheter directed intervention for PE.         1.  Pulmonary thromboembolism-  Bilateral PE extending from right and left main pulmonary arteries into segmental and subsegmental branches, provoked because patient had a cholecystectomy a month ago. Mild troponinemia on presentation to 0.04 that resolved. Echo shows pulmonary artery pressure at 37.5 mmg, and some evidence of right heart dilation. Vascular determined no intervention was needed. She was transitioned from heparin to Eliquis for anticoagulation.         2. Acute deep vein thrombosis (DVT) of popliteal vein: Continuing with Eliquis for AC         3. Hypertension: Patients home medications were held because patient was normotensive during her stay.         4. Diabetes: Patient is on home metformin, HgbA1c was 6.9 and was treated with moderate insulin sliding scale while here.         Inpatient Treatment Course: Patient presented to [facility] to [facility] with CT evidence showing a PE with evidence of right heart strain she was transferred here for possible intervention. While here, she was originally in the MICU due to right heart strain, mild troponin but vascular determined no intervention was needed and she was transtioned from heparin to Eliquis. She was doing well and non-hypoxic, non-tachycardiac and was stable for discharge.         New Medications: Eliquis     Labs to follow up: N/A     Exams to follow up: Cardiopulmonary

## 2019-09-30 NOTE — DISCHARGE NOTE NURSING/CASE MANAGEMENT/SOCIAL WORK - PATIENT PORTAL LINK FT
You can access the FollowMyHealth Patient Portal offered by [facility] by registering at the following website: [email or web address]. By joining [facility]’s FollowMyHealth portal, you will also be able to view your health information using other applications (apps) compatible with our system.

## 2019-10-03 DIAGNOSIS — T81.718A COMPLICATION OF OTHER ARTERY FOLLOWING A PROCEDURE, NOT ELSEWHERE CLASSIFIED, INITIAL ENCOUNTER: ICD-10-CM

## 2019-10-03 DIAGNOSIS — I82.439 ACUTE EMBOLISM AND THROMBOSIS OF UNSPECIFIED POPLITEAL VEIN: ICD-10-CM

## 2019-10-03 DIAGNOSIS — D64.9 ANEMIA, UNSPECIFIED: ICD-10-CM

## 2019-10-03 DIAGNOSIS — I26.09 OTHER PULMONARY EMBOLISM WITH ACUTE COR PULMONALE: ICD-10-CM
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
## 2019-10-03 DIAGNOSIS — I10 ESSENTIAL (PRIMARY) HYPERTENSION: ICD-10-CM

## 2019-10-03 DIAGNOSIS — E11.9 TYPE 2 DIABETES MELLITUS WITHOUT COMPLICATIONS: ICD-10-CM

## 2022-01-06 NOTE — PROGRESS NOTE ADULT - PROBLEM/PLAN-4
2022  EMPLOYEE INFORMATION: EMPLOYER INFORMATION:   NAME: Hattie Trujillo [facility]    : 1966 [phone]    DATE OF INJURY/EVENT: 2022            Location: [facility]   Treating Provider: Ramesh Espinoza PA-C  Time In:  8:52 AM Time Out:  9:13 AM      DIAGNOSIS:   1. Fall due to slipping on ice or snow, subsequent encounter    2. Contusion of left lower extremity, subsequent encounter    3. Contusion of right lower extremity, subsequent encounter    4. Contusion of right upper extremity, subsequent encounter    5. Contusion of right side of back, subsequent encounter    6. Abrasion of right lower leg, subsequent encounter    7. Abrasion of left lower extremity, subsequent encounter    8. Contusion of left knee, subsequent encounter      STATUS: This injury is determined to be WORK RELATED.  RETURN TO WORK:Employee may return to work with restrictions.   Return Date: 2022          RESTRICTIONS: Restrictions are to be followed at work and at home.  Restrictions are in effect until next follow-up visit.    Primarily sedentary work, okay to walk short distances  Good core posture; no bending, twisting, rotation   TREATMENT PLAN:   Medications for this injury/condition:   OTC therapy  Tizanidine at night  Referral/Consult: None  Diagnostic Testing: None       Instructions:   Ice and heat  Range of motion  Topical agents   Off 21  NEXT RETURN VISIT: About a week - 1.13.22 @ 8:15 AM    Thank you for the privilege of providing medical care for this injury/condition.  If there are any questions, please call the occupational health clinic at Dept: [phone].    Electronically signed on 2022 at 9:13 AM by:   Ramesh Espinoza PA-C   [facility]    The physician below agrees with the plan and restrictions placed on the patient by the provider above.  Victoriano Morris MD  
DISPLAY PLAN FREE TEXT

## 2023-11-15 NOTE — PROGRESS NOTE ADULT - ASSESSMENT
86 y/o F with PMH of HTN, Prediabetes, s/p cholecystectomy 8/23/19 presenting from [facility] with bilateral PE extending from right and left main pulmonary arteries into segmental and subsegmental branches admitted for possible catheter directed intervention for PE.    NEURO  -Patient AAO      CARDIAC    #Troponemia  -troponin of 0.04 on admission, likely 2/2 to PE  -continue to trend q6h    #HTN  -hold home BP meds as patient is normotensive      PULMONARY    #Bilateral PE  -Patient has right heart strain on bedside echo  -Was given Lovenox at 1:30pm  -Will start heparin bolus and drip at 9pm  -F/u LE Dopplers  -6 minute ambulation test, if fails will need catheter directed tPA    ENDO    #Prediabetes  -Patient on metformin at home  -mISS  -f/u A1c    GI  -No active issues at this time    RENAL  -Patient BUN is 28 on admission  -Continue to trend    F: None indicated  E: Replete when K<4 and Mg<2  N: DASH Consistent Carb  D: Heparin bolus and drip  Disposition: MICU 88 y/o F with PMH of HTN, Prediabetes, s/p cholecystectomy 8/23/19 presenting from [facility] with bilateral PE extending from right and left main pulmonary arteries into segmental and subsegmental branches admitted for possible catheter directed intervention for PE.    NEURO  -Patient AAO      CARDIAC    #Troponemia  -troponin of 0.04 on admission, likely 2/2 to PE  -downtrending overnight at 0.02  -Resolved    #HTN  -hold home BP meds as patient is normotensive    #Acute Right Heart Failure  -Patient has right heart strain from her bilateral pulmonary emboli    PULMONARY    #Bilateral PE  -Patient has right heart strain on bedside echo  -Was given Lovenox at 1:30pm  -Heparin drip decreased to 9mL/hr as PTT was elevated over 100  -F/u LE Dopplers  -6 minute ambulation test failed today, will repeat tomorrow  -consider catheter directed tPA    ENDO    #Prediabetes  -Patient on metformin at home  -mISS  -A1c 6.9    GI  -No active issues at this time    RENAL  -No active issue    F: None indicated  E: Replete when K<4 and Mg<2  N: DASH Consistent Carb  D: Heparin bolus and drip  Disposition: MICU 86 y/o F with PMH of HTN, Prediabetes, s/p cholecystectomy 8/23/19 presenting from [facility] with bilateral PE extending from right and left main pulmonary arteries into segmental and subsegmental branches admitted for possible catheter directed intervention for PE.    NEURO  -Patient AAO      CARDIAC    #Troponemia  -troponin of 0.04 on admission, likely 2/2 to PE  -downtrending overnight at 0.02  -Resolved    #HTN  -hold home BP meds as patient is normotensive    #Acute Right Heart Failure  -Patient has right heart strain from her bilateral pulmonary emboli    PULMONARY    #Bilateral PE  -Patient has right heart strain on bedside echo  -Was given Lovenox at 1:30pm  -Heparin drip decreased to 9mL/hr as PTT was elevated over 100  -LE Dopplers showed bilateral DVT  -6 minute ambulation test failed today, will repeat tomorrow  -consider catheter directed tPA    ENDO    #Prediabetes  -Patient on metformin at home  -mISS  -A1c 6.9    GI  -No active issues at this time    RENAL  -No active issue    F: None indicated  E: Replete when K<4 and Mg<2  N: DASH Consistent Carb  D: Heparin bolus and drip  Disposition: MICU musculoskeletal
